# Patient Record
Sex: FEMALE | Race: ASIAN | NOT HISPANIC OR LATINO
[De-identification: names, ages, dates, MRNs, and addresses within clinical notes are randomized per-mention and may not be internally consistent; named-entity substitution may affect disease eponyms.]

---

## 2022-09-14 ENCOUNTER — APPOINTMENT (OUTPATIENT)
Dept: OBGYN | Facility: CLINIC | Age: 27
End: 2022-09-14

## 2022-09-14 VITALS
DIASTOLIC BLOOD PRESSURE: 86 MMHG | SYSTOLIC BLOOD PRESSURE: 139 MMHG | WEIGHT: 152 LBS | HEIGHT: 63 IN | BODY MASS INDEX: 26.93 KG/M2

## 2022-09-14 DIAGNOSIS — Z82.49 FAMILY HISTORY OF ISCHEMIC HEART DISEASE AND OTHER DISEASES OF THE CIRCULATORY SYSTEM: ICD-10-CM

## 2022-09-14 DIAGNOSIS — N92.6 IRREGULAR MENSTRUATION, UNSPECIFIED: ICD-10-CM

## 2022-09-14 DIAGNOSIS — Z01.419 ENCOUNTER FOR GYNECOLOGICAL EXAMINATION (GENERAL) (ROUTINE) W/OUT ABNORMAL FINDINGS: ICD-10-CM

## 2022-09-14 DIAGNOSIS — Z78.9 OTHER SPECIFIED HEALTH STATUS: ICD-10-CM

## 2022-09-14 PROCEDURE — 99385 PREV VISIT NEW AGE 18-39: CPT

## 2022-09-14 RX ORDER — PRENATAL VIT NO.126/IRON/FOLIC 28MG-0.8MG
28-0.8 TABLET ORAL
Refills: 0 | Status: ACTIVE | COMMUNITY

## 2022-09-15 ENCOUNTER — NON-APPOINTMENT (OUTPATIENT)
Age: 27
End: 2022-09-15

## 2022-09-15 LAB
ABO + RH PNL BLD: NORMAL
BASOPHILS # BLD AUTO: 0.03 K/UL
BASOPHILS NFR BLD AUTO: 0.3 %
C TRACH RRNA SPEC QL NAA+PROBE: NOT DETECTED
EOSINOPHIL # BLD AUTO: 0.04 K/UL
EOSINOPHIL NFR BLD AUTO: 0.4 %
GLUCOSE SERPL-MCNC: 95 MG/DL
HAV IGM SER QL: NONREACTIVE
HBV CORE IGM SER QL: NONREACTIVE
HBV SURFACE AG SER QL: NONREACTIVE
HBV SURFACE AG SER QL: NONREACTIVE
HCT VFR BLD CALC: 35.8 %
HCV AB SER QL: NONREACTIVE
HCV S/CO RATIO: 0.13 S/CO
HGB A MFR BLD: 97.4 %
HGB A2 MFR BLD: 2.6 %
HGB BLD-MCNC: 12.1 G/DL
HGB FRACT BLD-IMP: NORMAL
HIV1+2 AB SPEC QL IA.RAPID: NONREACTIVE
HPV HIGH+LOW RISK DNA PNL CVX: NOT DETECTED
IMM GRANULOCYTES NFR BLD AUTO: 0.4 %
LYMPHOCYTES # BLD AUTO: 2.27 K/UL
LYMPHOCYTES NFR BLD AUTO: 23.9 %
MAN DIFF?: NORMAL
MCHC RBC-ENTMCNC: 29.7 PG
MCHC RBC-ENTMCNC: 33.8 GM/DL
MCV RBC AUTO: 88 FL
MEV IGG FLD QL IA: 52.5 AU/ML
MEV IGG+IGM SER-IMP: POSITIVE
MONOCYTES # BLD AUTO: 0.53 K/UL
MONOCYTES NFR BLD AUTO: 5.6 %
MUV AB SER-ACNC: POSITIVE
MUV IGG SER QL IA: 63.1 AU/ML
N GONORRHOEA RRNA SPEC QL NAA+PROBE: NOT DETECTED
NEUTROPHILS # BLD AUTO: 6.58 K/UL
NEUTROPHILS NFR BLD AUTO: 69.4 %
PLATELET # BLD AUTO: 245 K/UL
RBC # BLD: 4.07 M/UL
RBC # FLD: 13.9 %
RUBV IGG FLD-ACNC: 4.8 INDEX
RUBV IGG SER-IMP: POSITIVE
SOURCE AMPLIFICATION: NORMAL
T PALLIDUM AB SER QL IA: NEGATIVE
WBC # FLD AUTO: 9.49 K/UL

## 2022-09-16 ENCOUNTER — NON-APPOINTMENT (OUTPATIENT)
Age: 27
End: 2022-09-16

## 2022-09-16 LAB
APPEARANCE: CLEAR
BACTERIA UR CULT: NORMAL
BACTERIA: NEGATIVE
BILIRUBIN URINE: NEGATIVE
BLOOD URINE: NEGATIVE
COLOR: COLORLESS
ESTIMATED AVERAGE GLUCOSE: 120 MG/DL
GLUCOSE QUALITATIVE U: NEGATIVE
HBA1C MFR BLD HPLC: 5.8 %
HYALINE CASTS: 1 /LPF
KETONES URINE: NEGATIVE
LEAD BLD-MCNC: <1 UG/DL
LEUKOCYTE ESTERASE URINE: ABNORMAL
MICROSCOPIC-UA: NORMAL
NITRITE URINE: NEGATIVE
PH URINE: 7.5
PROTEIN URINE: NEGATIVE
RED BLOOD CELLS URINE: 1 /HPF
SPECIFIC GRAVITY URINE: 1.01
SQUAMOUS EPITHELIAL CELLS: 1 /HPF
UROBILINOGEN URINE: NORMAL
WHITE BLOOD CELLS URINE: 0 /HPF

## 2022-09-19 LAB — AR GENE MUT ANL BLD/T: NORMAL

## 2022-09-19 NOTE — PLAN
[FreeTextEntry1] : 27 y/o P0 at 16w by LMP presenting with amenorrhea:\par -f/u pap, GCT/CT\par -f/u prenatal blood work drawn today\par -f/u nipts done today\par -Pt already taking PNV\par -f/u 2 weeks for confirmation of pregnancy\par

## 2022-09-20 LAB
CYTOLOGY CVX/VAG DOC THIN PREP: ABNORMAL
FMR1 GENE MUT ANL BLD/T: NORMAL
M TB IFN-G BLD-IMP: POSITIVE
QUANTIFERON TB PLUS MITOGEN MINUS NIL: 7.07 IU/ML
QUANTIFERON TB PLUS NIL: 0.06 IU/ML
QUANTIFERON TB PLUS TB1 MINUS NIL: 0.46 IU/ML
QUANTIFERON TB PLUS TB2 MINUS NIL: 0.37 IU/ML

## 2022-09-23 LAB — CFTR MUT TESTED BLD/T: NEGATIVE

## 2022-09-27 LAB
CLARI ADDITIONAL INFO: NORMAL
CLARI CHROMOSOME 13: NORMAL
CLARI CHROMOSOME 18: NORMAL
CLARI CHROMOSOME 21: NORMAL
CLARI SEX CHROMOSOMES: NORMAL
CLARI TEST COMMENT: NORMAL
CLARITEST NIPT: NORMAL
FETAL FRACT: NORMAL
GESTATION AGE: NORMAL
MATERNAL WEIGHT (LBS):: NORMAL
PLEASE INCLUDE GENDER RESULTS ON THIS REPORT:: NORMAL
TYPE OF PREGNANCY:: NORMAL

## 2022-09-28 ENCOUNTER — APPOINTMENT (OUTPATIENT)
Dept: ANTEPARTUM | Facility: CLINIC | Age: 27
End: 2022-09-28

## 2022-09-28 ENCOUNTER — APPOINTMENT (OUTPATIENT)
Dept: OBGYN | Facility: CLINIC | Age: 27
End: 2022-09-28

## 2022-09-28 DIAGNOSIS — Z3A.16 16 WEEKS GESTATION OF PREGNANCY: ICD-10-CM

## 2022-09-28 PROCEDURE — 99212 OFFICE O/P EST SF 10 MIN: CPT

## 2022-09-28 PROCEDURE — 76815 OB US LIMITED FETUS(S): CPT

## 2022-09-29 LAB
BASOPHILS # BLD AUTO: 0.02 K/UL
BASOPHILS NFR BLD AUTO: 0.2 %
EOSINOPHIL # BLD AUTO: 0.03 K/UL
EOSINOPHIL NFR BLD AUTO: 0.3 %
HCT VFR BLD CALC: 36.4 %
HGB BLD-MCNC: 11.9 G/DL
IMM GRANULOCYTES NFR BLD AUTO: 0.5 %
LYMPHOCYTES # BLD AUTO: 1.48 K/UL
LYMPHOCYTES NFR BLD AUTO: 16.1 %
MAN DIFF?: NORMAL
MCHC RBC-ENTMCNC: 29.8 PG
MCHC RBC-ENTMCNC: 32.7 GM/DL
MCV RBC AUTO: 91 FL
MONOCYTES # BLD AUTO: 0.36 K/UL
MONOCYTES NFR BLD AUTO: 3.9 %
NEUTROPHILS # BLD AUTO: 7.23 K/UL
NEUTROPHILS NFR BLD AUTO: 79 %
PLATELET # BLD AUTO: 234 K/UL
RBC # BLD: 4 M/UL
RBC # FLD: 14.2 %
WBC # FLD AUTO: 9.17 K/UL

## 2022-09-30 DIAGNOSIS — Z00.00 ENCOUNTER FOR GENERAL ADULT MEDICAL EXAMINATION W/OUT ABNORMAL FINDINGS: ICD-10-CM

## 2022-09-30 LAB — GLUCOSE 1H P 50 G GLC PO SERPL-MCNC: 166 MG/DL

## 2022-10-10 ENCOUNTER — TRANSCRIPTION ENCOUNTER (OUTPATIENT)
Age: 27
End: 2022-10-10

## 2022-10-13 ENCOUNTER — NON-APPOINTMENT (OUTPATIENT)
Age: 27
End: 2022-10-13

## 2022-10-13 LAB
ESTIMATED AVERAGE GLUCOSE: 114 MG/DL
HBA1C MFR BLD HPLC: 5.6 %

## 2022-10-26 ENCOUNTER — APPOINTMENT (OUTPATIENT)
Dept: ANTEPARTUM | Facility: CLINIC | Age: 27
End: 2022-10-26

## 2022-10-26 ENCOUNTER — APPOINTMENT (OUTPATIENT)
Dept: OBGYN | Facility: CLINIC | Age: 27
End: 2022-10-26

## 2022-10-26 VITALS
WEIGHT: 150 LBS | HEIGHT: 63 IN | SYSTOLIC BLOOD PRESSURE: 146 MMHG | DIASTOLIC BLOOD PRESSURE: 92 MMHG | BODY MASS INDEX: 26.58 KG/M2

## 2022-10-26 PROCEDURE — 99212 OFFICE O/P EST SF 10 MIN: CPT

## 2022-10-26 PROCEDURE — 76811 OB US DETAILED SNGL FETUS: CPT

## 2022-10-31 ENCOUNTER — APPOINTMENT (OUTPATIENT)
Dept: OBGYN | Facility: CLINIC | Age: 27
End: 2022-10-31

## 2022-10-31 ENCOUNTER — APPOINTMENT (OUTPATIENT)
Dept: ANTEPARTUM | Facility: CLINIC | Age: 27
End: 2022-10-31

## 2022-10-31 VITALS
SYSTOLIC BLOOD PRESSURE: 153 MMHG | HEIGHT: 63 IN | BODY MASS INDEX: 27.11 KG/M2 | DIASTOLIC BLOOD PRESSURE: 106 MMHG | WEIGHT: 153 LBS

## 2022-10-31 PROCEDURE — ZZZZZ: CPT

## 2022-10-31 PROCEDURE — 76816 OB US FOLLOW-UP PER FETUS: CPT

## 2022-11-28 ENCOUNTER — APPOINTMENT (OUTPATIENT)
Dept: OBGYN | Facility: CLINIC | Age: 27
End: 2022-11-28

## 2022-11-28 VITALS — BODY MASS INDEX: 27.28 KG/M2 | SYSTOLIC BLOOD PRESSURE: 149 MMHG | DIASTOLIC BLOOD PRESSURE: 80 MMHG | WEIGHT: 154 LBS

## 2022-11-28 PROCEDURE — 99212 OFFICE O/P EST SF 10 MIN: CPT

## 2022-11-29 ENCOUNTER — NON-APPOINTMENT (OUTPATIENT)
Age: 27
End: 2022-11-29

## 2022-11-29 LAB
BASOPHILS # BLD AUTO: 0.03 K/UL
BASOPHILS NFR BLD AUTO: 0.3 %
EOSINOPHIL # BLD AUTO: 0.04 K/UL
EOSINOPHIL NFR BLD AUTO: 0.3 %
GLUCOSE 1H P 50 G GLC PO SERPL-MCNC: 131 MG/DL
HCT VFR BLD CALC: 36.6 %
HGB BLD-MCNC: 11.5 G/DL
IMM GRANULOCYTES NFR BLD AUTO: 0.6 %
LYMPHOCYTES # BLD AUTO: 1.59 K/UL
LYMPHOCYTES NFR BLD AUTO: 13.9 %
MAN DIFF?: NORMAL
MCHC RBC-ENTMCNC: 30.3 PG
MCHC RBC-ENTMCNC: 31.4 GM/DL
MCV RBC AUTO: 96.6 FL
MONOCYTES # BLD AUTO: 0.59 K/UL
MONOCYTES NFR BLD AUTO: 5.2 %
NEUTROPHILS # BLD AUTO: 9.13 K/UL
NEUTROPHILS NFR BLD AUTO: 79.7 %
PLATELET # BLD AUTO: 204 K/UL
RBC # BLD: 3.79 M/UL
RBC # FLD: 14.9 %
WBC # FLD AUTO: 11.45 K/UL

## 2022-12-12 ENCOUNTER — NON-APPOINTMENT (OUTPATIENT)
Age: 27
End: 2022-12-12

## 2022-12-21 ENCOUNTER — MED ADMIN CHARGE (OUTPATIENT)
Age: 27
End: 2022-12-21

## 2022-12-21 ENCOUNTER — APPOINTMENT (OUTPATIENT)
Dept: OBGYN | Facility: CLINIC | Age: 27
End: 2022-12-21

## 2022-12-21 DIAGNOSIS — Z33.1 PREGNANT STATE, INCIDENTAL: ICD-10-CM

## 2022-12-21 PROCEDURE — 99212 OFFICE O/P EST SF 10 MIN: CPT

## 2022-12-21 RX ORDER — NIFEDIPINE 30 MG/1
30 TABLET, FILM COATED, EXTENDED RELEASE ORAL DAILY
Qty: 30 | Refills: 3 | Status: ACTIVE | COMMUNITY
Start: 2022-12-21 | End: 1900-01-01

## 2023-01-04 ENCOUNTER — APPOINTMENT (OUTPATIENT)
Dept: ANTEPARTUM | Facility: CLINIC | Age: 28
End: 2023-01-04
Payer: COMMERCIAL

## 2023-01-04 ENCOUNTER — APPOINTMENT (OUTPATIENT)
Dept: OBGYN | Facility: CLINIC | Age: 28
End: 2023-01-04
Payer: COMMERCIAL

## 2023-01-04 VITALS — SYSTOLIC BLOOD PRESSURE: 146 MMHG | DIASTOLIC BLOOD PRESSURE: 90 MMHG | WEIGHT: 162 LBS | BODY MASS INDEX: 28.7 KG/M2

## 2023-01-04 PROCEDURE — 99212 OFFICE O/P EST SF 10 MIN: CPT

## 2023-01-04 PROCEDURE — 76816 OB US FOLLOW-UP PER FETUS: CPT

## 2023-01-04 PROCEDURE — 76819 FETAL BIOPHYS PROFIL W/O NST: CPT | Mod: 59

## 2023-01-17 ENCOUNTER — APPOINTMENT (OUTPATIENT)
Dept: OBGYN | Facility: CLINIC | Age: 28
End: 2023-01-17
Payer: COMMERCIAL

## 2023-01-17 VITALS
BODY MASS INDEX: 29.23 KG/M2 | SYSTOLIC BLOOD PRESSURE: 152 MMHG | DIASTOLIC BLOOD PRESSURE: 94 MMHG | HEART RATE: 134 BPM | WEIGHT: 165 LBS | HEIGHT: 63 IN

## 2023-01-17 DIAGNOSIS — Z23 ENCOUNTER FOR IMMUNIZATION: ICD-10-CM

## 2023-01-17 DIAGNOSIS — O13.9 GESTATIONAL [PREGNANCY-INDUCED] HYPERTENSION W/OUT SIGNIFICANT PROTEINURIA, UNSPECIFIED TRIMESTER: ICD-10-CM

## 2023-01-17 DIAGNOSIS — R76.12 NONSPECIFIC REACTION TO CELL MEDIATED IMMUNITY MEASUREMENT OF GAMMA INTERFERON ANTIGEN RESPONSE W/OUT ACTIVE TUBERCULOSIS: ICD-10-CM

## 2023-01-17 PROCEDURE — 90471 IMMUNIZATION ADMIN: CPT

## 2023-01-17 PROCEDURE — 99212 OFFICE O/P EST SF 10 MIN: CPT | Mod: 25

## 2023-01-17 PROCEDURE — 90715 TDAP VACCINE 7 YRS/> IM: CPT

## 2023-01-18 LAB
ALBUMIN SERPL ELPH-MCNC: 3.9 G/DL
ALP BLD-CCNC: 86 U/L
ALT SERPL-CCNC: 12 U/L
ANION GAP SERPL CALC-SCNC: 15 MMOL/L
APTT BLD: 29.3 SEC
AST SERPL-CCNC: 14 U/L
BASOPHILS # BLD AUTO: 0.02 K/UL
BASOPHILS NFR BLD AUTO: 0.2 %
BILIRUB SERPL-MCNC: <0.2 MG/DL
BUN SERPL-MCNC: 8 MG/DL
CALCIUM SERPL-MCNC: 10 MG/DL
CHLORIDE SERPL-SCNC: 102 MMOL/L
CO2 SERPL-SCNC: 20 MMOL/L
CREAT SERPL-MCNC: 0.46 MG/DL
CREAT SPEC-SCNC: 77 MG/DL
CREAT/PROT UR: 0.1 RATIO
EGFR: 135 ML/MIN/1.73M2
EOSINOPHIL # BLD AUTO: 0.02 K/UL
EOSINOPHIL NFR BLD AUTO: 0.2 %
GLUCOSE SERPL-MCNC: 128 MG/DL
HCT VFR BLD CALC: 38.4 %
HGB BLD-MCNC: 12.7 G/DL
IMM GRANULOCYTES NFR BLD AUTO: 0.5 %
INR PPP: 1.04 RATIO
LDH SERPL-CCNC: 158 U/L
LYMPHOCYTES # BLD AUTO: 1.78 K/UL
LYMPHOCYTES NFR BLD AUTO: 19.5 %
MAN DIFF?: NORMAL
MCHC RBC-ENTMCNC: 31 PG
MCHC RBC-ENTMCNC: 33.1 GM/DL
MCV RBC AUTO: 93.7 FL
MONOCYTES # BLD AUTO: 0.52 K/UL
MONOCYTES NFR BLD AUTO: 5.7 %
NEUTROPHILS # BLD AUTO: 6.76 K/UL
NEUTROPHILS NFR BLD AUTO: 73.9 %
PLATELET # BLD AUTO: 200 K/UL
POTASSIUM SERPL-SCNC: 3.9 MMOL/L
PROT SERPL-MCNC: 7.2 G/DL
PROT UR-MCNC: 9 MG/DL
PT BLD: 12.1 SEC
RBC # BLD: 4.1 M/UL
RBC # FLD: 13.8 %
SODIUM SERPL-SCNC: 137 MMOL/L
URATE SERPL-MCNC: 4.7 MG/DL
WBC # FLD AUTO: 9.15 K/UL

## 2023-02-02 ENCOUNTER — APPOINTMENT (OUTPATIENT)
Dept: OBGYN | Facility: CLINIC | Age: 28
End: 2023-02-02
Payer: COMMERCIAL

## 2023-02-02 VITALS — WEIGHT: 168.8 LBS | BODY MASS INDEX: 29.9 KG/M2

## 2023-02-02 PROCEDURE — 99212 OFFICE O/P EST SF 10 MIN: CPT

## 2023-02-06 ENCOUNTER — NON-APPOINTMENT (OUTPATIENT)
Age: 28
End: 2023-02-06

## 2023-02-07 ENCOUNTER — OUTPATIENT (OUTPATIENT)
Dept: OUTPATIENT SERVICES | Facility: HOSPITAL | Age: 28
LOS: 1 days | End: 2023-02-07
Payer: COMMERCIAL

## 2023-02-07 ENCOUNTER — RESULT REVIEW (OUTPATIENT)
Age: 28
End: 2023-02-07

## 2023-02-07 ENCOUNTER — APPOINTMENT (OUTPATIENT)
Dept: RADIOLOGY | Facility: CLINIC | Age: 28
End: 2023-02-07
Payer: COMMERCIAL

## 2023-02-07 DIAGNOSIS — R76.12 NONSPECIFIC REACTION TO CELL MEDIATED IMMUNITY MEASUREMENT OF GAMMA INTERFERON ANTIGEN RESPONSE WITHOUT ACTIVE TUBERCULOSIS: ICD-10-CM

## 2023-02-07 PROCEDURE — 71045 X-RAY EXAM CHEST 1 VIEW: CPT | Mod: 26

## 2023-02-07 PROCEDURE — 71045 X-RAY EXAM CHEST 1 VIEW: CPT

## 2023-02-12 ENCOUNTER — TRANSCRIPTION ENCOUNTER (OUTPATIENT)
Age: 28
End: 2023-02-12

## 2023-02-13 ENCOUNTER — RESULT REVIEW (OUTPATIENT)
Age: 28
End: 2023-02-13

## 2023-02-13 ENCOUNTER — TRANSCRIPTION ENCOUNTER (OUTPATIENT)
Age: 28
End: 2023-02-13

## 2023-02-13 ENCOUNTER — APPOINTMENT (OUTPATIENT)
Dept: OBGYN | Facility: CLINIC | Age: 28
End: 2023-02-13

## 2023-02-13 ENCOUNTER — INPATIENT (INPATIENT)
Facility: HOSPITAL | Age: 28
LOS: 2 days | Discharge: HOME | End: 2023-02-16
Attending: OBSTETRICS & GYNECOLOGY | Admitting: OBSTETRICS & GYNECOLOGY
Payer: COMMERCIAL

## 2023-02-13 VITALS — TEMPERATURE: 98 F

## 2023-02-13 DIAGNOSIS — O13.9 GESTATIONAL [PREGNANCY-INDUCED] HYPERTENSION WITHOUT SIGNIFICANT PROTEINURIA, UNSPECIFIED TRIMESTER: ICD-10-CM

## 2023-02-13 DIAGNOSIS — O26.899 OTHER SPECIFIED PREGNANCY RELATED CONDITIONS, UNSPECIFIED TRIMESTER: ICD-10-CM

## 2023-02-13 DIAGNOSIS — O42.919 PRETERM PREMATURE RUPTURE OF MEMBRANES, UNSPECIFIED AS TO LENGTH OF TIME BETWEEN RUPTURE AND ONSET OF LABOR, UNSPECIFIED TRIMESTER: ICD-10-CM

## 2023-02-13 LAB
ALBUMIN SERPL ELPH-MCNC: 3.5 G/DL — SIGNIFICANT CHANGE UP (ref 3.3–5)
ALBUMIN SERPL ELPH-MCNC: 3.6 G/DL — SIGNIFICANT CHANGE UP (ref 3.3–5)
ALBUMIN SERPL ELPH-MCNC: 3.8 G/DL — SIGNIFICANT CHANGE UP (ref 3.3–5)
ALP SERPL-CCNC: 103 U/L — SIGNIFICANT CHANGE UP (ref 40–120)
ALP SERPL-CCNC: 104 U/L — SIGNIFICANT CHANGE UP (ref 40–120)
ALP SERPL-CCNC: 112 U/L — SIGNIFICANT CHANGE UP (ref 40–120)
ALT FLD-CCNC: 12 U/L — SIGNIFICANT CHANGE UP (ref 4–33)
ANION GAP SERPL CALC-SCNC: 12 MMOL/L — SIGNIFICANT CHANGE UP (ref 7–14)
ANION GAP SERPL CALC-SCNC: 13 MMOL/L — SIGNIFICANT CHANGE UP (ref 7–14)
ANION GAP SERPL CALC-SCNC: 14 MMOL/L — SIGNIFICANT CHANGE UP (ref 7–14)
APPEARANCE UR: CLEAR — SIGNIFICANT CHANGE UP
APTT BLD: 26.7 SEC — LOW (ref 27–36.3)
APTT BLD: 26.7 SEC — LOW (ref 27–36.3)
APTT BLD: 27 SEC — SIGNIFICANT CHANGE UP (ref 27–36.3)
AST SERPL-CCNC: 14 U/L — SIGNIFICANT CHANGE UP (ref 4–32)
AST SERPL-CCNC: 14 U/L — SIGNIFICANT CHANGE UP (ref 4–32)
AST SERPL-CCNC: 16 U/L — SIGNIFICANT CHANGE UP (ref 4–32)
BACTERIA # UR AUTO: NEGATIVE — SIGNIFICANT CHANGE UP
BASOPHILS # BLD AUTO: 0.02 K/UL — SIGNIFICANT CHANGE UP (ref 0–0.2)
BASOPHILS # BLD AUTO: 0.02 K/UL — SIGNIFICANT CHANGE UP (ref 0–0.2)
BASOPHILS # BLD AUTO: 0.03 K/UL — SIGNIFICANT CHANGE UP (ref 0–0.2)
BASOPHILS NFR BLD AUTO: 0.2 % — SIGNIFICANT CHANGE UP (ref 0–2)
BILIRUB SERPL-MCNC: 0.2 MG/DL — SIGNIFICANT CHANGE UP (ref 0.2–1.2)
BILIRUB SERPL-MCNC: <0.2 MG/DL — SIGNIFICANT CHANGE UP (ref 0.2–1.2)
BILIRUB SERPL-MCNC: <0.2 MG/DL — SIGNIFICANT CHANGE UP (ref 0.2–1.2)
BILIRUB UR-MCNC: NEGATIVE — SIGNIFICANT CHANGE UP
BLD GP AB SCN SERPL QL: NEGATIVE — SIGNIFICANT CHANGE UP
BUN SERPL-MCNC: 11 MG/DL — SIGNIFICANT CHANGE UP (ref 7–23)
BUN SERPL-MCNC: 6 MG/DL — LOW (ref 7–23)
BUN SERPL-MCNC: 7 MG/DL — SIGNIFICANT CHANGE UP (ref 7–23)
CALCIUM SERPL-MCNC: 9.2 MG/DL — SIGNIFICANT CHANGE UP (ref 8.4–10.5)
CALCIUM SERPL-MCNC: 9.4 MG/DL — SIGNIFICANT CHANGE UP (ref 8.4–10.5)
CALCIUM SERPL-MCNC: 9.8 MG/DL — SIGNIFICANT CHANGE UP (ref 8.4–10.5)
CHLORIDE SERPL-SCNC: 100 MMOL/L — SIGNIFICANT CHANGE UP (ref 98–107)
CHLORIDE SERPL-SCNC: 103 MMOL/L — SIGNIFICANT CHANGE UP (ref 98–107)
CHLORIDE SERPL-SCNC: 99 MMOL/L — SIGNIFICANT CHANGE UP (ref 98–107)
CO2 SERPL-SCNC: 21 MMOL/L — LOW (ref 22–31)
CO2 SERPL-SCNC: 22 MMOL/L — SIGNIFICANT CHANGE UP (ref 22–31)
CO2 SERPL-SCNC: 23 MMOL/L — SIGNIFICANT CHANGE UP (ref 22–31)
COLOR SPEC: SIGNIFICANT CHANGE UP
CREAT ?TM UR-MCNC: 100 MG/DL — SIGNIFICANT CHANGE UP
CREAT SERPL-MCNC: 0.43 MG/DL — LOW (ref 0.5–1.3)
CREAT SERPL-MCNC: 0.43 MG/DL — LOW (ref 0.5–1.3)
CREAT SERPL-MCNC: 0.53 MG/DL — SIGNIFICANT CHANGE UP (ref 0.5–1.3)
DIFF PNL FLD: NEGATIVE — SIGNIFICANT CHANGE UP
EGFR: 131 ML/MIN/1.73M2 — SIGNIFICANT CHANGE UP
EGFR: 137 ML/MIN/1.73M2 — SIGNIFICANT CHANGE UP
EGFR: 137 ML/MIN/1.73M2 — SIGNIFICANT CHANGE UP
EOSINOPHIL # BLD AUTO: 0 K/UL — SIGNIFICANT CHANGE UP (ref 0–0.5)
EOSINOPHIL # BLD AUTO: 0 K/UL — SIGNIFICANT CHANGE UP (ref 0–0.5)
EOSINOPHIL # BLD AUTO: 0.09 K/UL — SIGNIFICANT CHANGE UP (ref 0–0.5)
EOSINOPHIL NFR BLD AUTO: 0 % — SIGNIFICANT CHANGE UP (ref 0–6)
EOSINOPHIL NFR BLD AUTO: 0 % — SIGNIFICANT CHANGE UP (ref 0–6)
EOSINOPHIL NFR BLD AUTO: 0.8 % — SIGNIFICANT CHANGE UP (ref 0–6)
EPI CELLS # UR: 2 /HPF — SIGNIFICANT CHANGE UP (ref 0–5)
FIBRINOGEN PPP-MCNC: 711 MG/DL — HIGH (ref 200–465)
FIBRINOGEN PPP-MCNC: 734 MG/DL — HIGH (ref 200–465)
GLUCOSE SERPL-MCNC: 77 MG/DL — SIGNIFICANT CHANGE UP (ref 70–99)
GLUCOSE SERPL-MCNC: 85 MG/DL — SIGNIFICANT CHANGE UP (ref 70–99)
GLUCOSE SERPL-MCNC: 99 MG/DL — SIGNIFICANT CHANGE UP (ref 70–99)
GLUCOSE UR QL: NEGATIVE — SIGNIFICANT CHANGE UP
HCT VFR BLD CALC: 36.2 % — SIGNIFICANT CHANGE UP (ref 34.5–45)
HCT VFR BLD CALC: 36.2 % — SIGNIFICANT CHANGE UP (ref 34.5–45)
HCT VFR BLD CALC: 38.3 % — SIGNIFICANT CHANGE UP (ref 34.5–45)
HGB BLD-MCNC: 12.1 G/DL — SIGNIFICANT CHANGE UP (ref 11.5–15.5)
HGB BLD-MCNC: 12.2 G/DL — SIGNIFICANT CHANGE UP (ref 11.5–15.5)
HGB BLD-MCNC: 12.6 G/DL — SIGNIFICANT CHANGE UP (ref 11.5–15.5)
IANC: 12.83 K/UL — HIGH (ref 1.8–7.4)
IANC: 6.61 K/UL — SIGNIFICANT CHANGE UP (ref 1.8–7.4)
IANC: 7.79 K/UL — HIGH (ref 1.8–7.4)
IMM GRANULOCYTES NFR BLD AUTO: 0.6 % — SIGNIFICANT CHANGE UP (ref 0–0.9)
IMM GRANULOCYTES NFR BLD AUTO: 0.8 % — SIGNIFICANT CHANGE UP (ref 0–0.9)
IMM GRANULOCYTES NFR BLD AUTO: 0.8 % — SIGNIFICANT CHANGE UP (ref 0–0.9)
INR BLD: 0.91 RATIO — SIGNIFICANT CHANGE UP (ref 0.88–1.16)
INR BLD: 0.97 RATIO — SIGNIFICANT CHANGE UP (ref 0.88–1.16)
INR BLD: 1 RATIO — SIGNIFICANT CHANGE UP (ref 0.88–1.16)
KETONES UR-MCNC: NEGATIVE — SIGNIFICANT CHANGE UP
LDH SERPL L TO P-CCNC: 145 U/L — SIGNIFICANT CHANGE UP (ref 135–225)
LDH SERPL L TO P-CCNC: 149 U/L — SIGNIFICANT CHANGE UP (ref 135–225)
LDH SERPL L TO P-CCNC: 158 U/L — SIGNIFICANT CHANGE UP (ref 135–225)
LDH SERPL L TO P-CCNC: 158 U/L — SIGNIFICANT CHANGE UP (ref 135–225)
LEUKOCYTE ESTERASE UR-ACNC: NEGATIVE — SIGNIFICANT CHANGE UP
LYMPHOCYTES # BLD AUTO: 1.68 K/UL — SIGNIFICANT CHANGE UP (ref 1–3.3)
LYMPHOCYTES # BLD AUTO: 1.75 K/UL — SIGNIFICANT CHANGE UP (ref 1–3.3)
LYMPHOCYTES # BLD AUTO: 11.4 % — LOW (ref 13–44)
LYMPHOCYTES # BLD AUTO: 16.9 % — SIGNIFICANT CHANGE UP (ref 13–44)
LYMPHOCYTES # BLD AUTO: 3.32 K/UL — HIGH (ref 1–3.3)
LYMPHOCYTES # BLD AUTO: 30.8 % — SIGNIFICANT CHANGE UP (ref 13–44)
MCHC RBC-ENTMCNC: 29.2 PG — SIGNIFICANT CHANGE UP (ref 27–34)
MCHC RBC-ENTMCNC: 29.2 PG — SIGNIFICANT CHANGE UP (ref 27–34)
MCHC RBC-ENTMCNC: 29.5 PG — SIGNIFICANT CHANGE UP (ref 27–34)
MCHC RBC-ENTMCNC: 32.9 GM/DL — SIGNIFICANT CHANGE UP (ref 32–36)
MCHC RBC-ENTMCNC: 33.4 GM/DL — SIGNIFICANT CHANGE UP (ref 32–36)
MCHC RBC-ENTMCNC: 33.7 GM/DL — SIGNIFICANT CHANGE UP (ref 32–36)
MCV RBC AUTO: 86.6 FL — SIGNIFICANT CHANGE UP (ref 80–100)
MCV RBC AUTO: 87.4 FL — SIGNIFICANT CHANGE UP (ref 80–100)
MCV RBC AUTO: 89.7 FL — SIGNIFICANT CHANGE UP (ref 80–100)
MONOCYTES # BLD AUTO: 0.38 K/UL — SIGNIFICANT CHANGE UP (ref 0–0.9)
MONOCYTES # BLD AUTO: 0.62 K/UL — SIGNIFICANT CHANGE UP (ref 0–0.9)
MONOCYTES # BLD AUTO: 0.68 K/UL — SIGNIFICANT CHANGE UP (ref 0–0.9)
MONOCYTES NFR BLD AUTO: 3.8 % — SIGNIFICANT CHANGE UP (ref 2–14)
MONOCYTES NFR BLD AUTO: 4 % — SIGNIFICANT CHANGE UP (ref 2–14)
MONOCYTES NFR BLD AUTO: 6.3 % — SIGNIFICANT CHANGE UP (ref 2–14)
NEUTROPHILS # BLD AUTO: 12.83 K/UL — HIGH (ref 1.8–7.4)
NEUTROPHILS # BLD AUTO: 6.61 K/UL — SIGNIFICANT CHANGE UP (ref 1.8–7.4)
NEUTROPHILS # BLD AUTO: 7.79 K/UL — HIGH (ref 1.8–7.4)
NEUTROPHILS NFR BLD AUTO: 61.3 % — SIGNIFICANT CHANGE UP (ref 43–77)
NEUTROPHILS NFR BLD AUTO: 78.3 % — HIGH (ref 43–77)
NEUTROPHILS NFR BLD AUTO: 83.6 % — HIGH (ref 43–77)
NITRITE UR-MCNC: NEGATIVE — SIGNIFICANT CHANGE UP
NRBC # BLD: 0 /100 WBCS — SIGNIFICANT CHANGE UP (ref 0–0)
NRBC # FLD: 0 K/UL — SIGNIFICANT CHANGE UP (ref 0–0)
PH UR: 7 — SIGNIFICANT CHANGE UP (ref 5–8)
PLATELET # BLD AUTO: 219 K/UL — SIGNIFICANT CHANGE UP (ref 150–400)
PLATELET # BLD AUTO: 226 K/UL — SIGNIFICANT CHANGE UP (ref 150–400)
PLATELET # BLD AUTO: 227 K/UL — SIGNIFICANT CHANGE UP (ref 150–400)
POTASSIUM SERPL-MCNC: 3.6 MMOL/L — SIGNIFICANT CHANGE UP (ref 3.5–5.3)
POTASSIUM SERPL-MCNC: 3.9 MMOL/L — SIGNIFICANT CHANGE UP (ref 3.5–5.3)
POTASSIUM SERPL-MCNC: 3.9 MMOL/L — SIGNIFICANT CHANGE UP (ref 3.5–5.3)
POTASSIUM SERPL-SCNC: 3.6 MMOL/L — SIGNIFICANT CHANGE UP (ref 3.5–5.3)
POTASSIUM SERPL-SCNC: 3.9 MMOL/L — SIGNIFICANT CHANGE UP (ref 3.5–5.3)
POTASSIUM SERPL-SCNC: 3.9 MMOL/L — SIGNIFICANT CHANGE UP (ref 3.5–5.3)
PROT ?TM UR-MCNC: 34 MG/DL — SIGNIFICANT CHANGE UP
PROT SERPL-MCNC: 6.6 G/DL — SIGNIFICANT CHANGE UP (ref 6–8.3)
PROT SERPL-MCNC: 6.7 G/DL — SIGNIFICANT CHANGE UP (ref 6–8.3)
PROT SERPL-MCNC: 7.4 G/DL — SIGNIFICANT CHANGE UP (ref 6–8.3)
PROT UR-MCNC: ABNORMAL
PROT/CREAT UR-RTO: 0.3 RATIO — HIGH (ref 0–0.2)
PROTHROM AB SERPL-ACNC: 10.6 SEC — SIGNIFICANT CHANGE UP (ref 10.5–13.4)
PROTHROM AB SERPL-ACNC: 11.2 SEC — SIGNIFICANT CHANGE UP (ref 10.5–13.4)
PROTHROM AB SERPL-ACNC: 11.6 SEC — SIGNIFICANT CHANGE UP (ref 10.5–13.4)
RBC # BLD: 4.14 M/UL — SIGNIFICANT CHANGE UP (ref 3.8–5.2)
RBC # BLD: 4.18 M/UL — SIGNIFICANT CHANGE UP (ref 3.8–5.2)
RBC # BLD: 4.27 M/UL — SIGNIFICANT CHANGE UP (ref 3.8–5.2)
RBC # FLD: 13.2 % — SIGNIFICANT CHANGE UP (ref 10.3–14.5)
RBC # FLD: 13.2 % — SIGNIFICANT CHANGE UP (ref 10.3–14.5)
RBC # FLD: 13.3 % — SIGNIFICANT CHANGE UP (ref 10.3–14.5)
RBC CASTS # UR COMP ASSIST: 4 /HPF — SIGNIFICANT CHANGE UP (ref 0–4)
RH IG SCN BLD-IMP: POSITIVE — SIGNIFICANT CHANGE UP
RH IG SCN BLD-IMP: POSITIVE — SIGNIFICANT CHANGE UP
SARS-COV-2 RNA SPEC QL NAA+PROBE: SIGNIFICANT CHANGE UP
SODIUM SERPL-SCNC: 134 MMOL/L — LOW (ref 135–145)
SODIUM SERPL-SCNC: 135 MMOL/L — SIGNIFICANT CHANGE UP (ref 135–145)
SODIUM SERPL-SCNC: 138 MMOL/L — SIGNIFICANT CHANGE UP (ref 135–145)
SP GR SPEC: 1.02 — SIGNIFICANT CHANGE UP (ref 1.01–1.05)
T PALLIDUM AB TITR SER: NEGATIVE — SIGNIFICANT CHANGE UP
URATE SERPL-MCNC: 5.5 MG/DL — SIGNIFICANT CHANGE UP (ref 2.5–7)
URATE SERPL-MCNC: 5.7 MG/DL — SIGNIFICANT CHANGE UP (ref 2.5–7)
URATE SERPL-MCNC: 6 MG/DL — SIGNIFICANT CHANGE UP (ref 2.5–7)
UROBILINOGEN FLD QL: SIGNIFICANT CHANGE UP
WBC # BLD: 10.79 K/UL — HIGH (ref 3.8–10.5)
WBC # BLD: 15.36 K/UL — HIGH (ref 3.8–10.5)
WBC # BLD: 9.95 K/UL — SIGNIFICANT CHANGE UP (ref 3.8–10.5)
WBC # FLD AUTO: 10.79 K/UL — HIGH (ref 3.8–10.5)
WBC # FLD AUTO: 15.36 K/UL — HIGH (ref 3.8–10.5)
WBC # FLD AUTO: 9.95 K/UL — SIGNIFICANT CHANGE UP (ref 3.8–10.5)
WBC UR QL: 1 /HPF — SIGNIFICANT CHANGE UP (ref 0–5)

## 2023-02-13 PROCEDURE — 59514 CESAREAN DELIVERY ONLY: CPT | Mod: U7

## 2023-02-13 PROCEDURE — 88307 TISSUE EXAM BY PATHOLOGIST: CPT | Mod: 26

## 2023-02-13 RX ORDER — SODIUM CHLORIDE 9 MG/ML
300 INJECTION INTRAMUSCULAR; INTRAVENOUS; SUBCUTANEOUS ONCE
Refills: 0 | Status: COMPLETED | OUTPATIENT
Start: 2023-02-13 | End: 2023-02-13

## 2023-02-13 RX ORDER — CHLORHEXIDINE GLUCONATE 213 G/1000ML
1 SOLUTION TOPICAL ONCE
Refills: 0 | Status: DISCONTINUED | OUTPATIENT
Start: 2023-02-13 | End: 2023-02-14

## 2023-02-13 RX ORDER — SODIUM CHLORIDE 9 MG/ML
1000 INJECTION INTRAMUSCULAR; INTRAVENOUS; SUBCUTANEOUS
Refills: 0 | Status: DISCONTINUED | OUTPATIENT
Start: 2023-02-13 | End: 2023-02-14

## 2023-02-13 RX ORDER — AMPICILLIN TRIHYDRATE 250 MG
1 CAPSULE ORAL EVERY 4 HOURS
Refills: 0 | Status: DISCONTINUED | OUTPATIENT
Start: 2023-02-13 | End: 2023-02-14

## 2023-02-13 RX ORDER — SODIUM CHLORIDE 9 MG/ML
300 INJECTION INTRAMUSCULAR; INTRAVENOUS; SUBCUTANEOUS ONCE
Refills: 0 | Status: DISCONTINUED | OUTPATIENT
Start: 2023-02-13 | End: 2023-02-13

## 2023-02-13 RX ORDER — SODIUM CHLORIDE 9 MG/ML
1000 INJECTION, SOLUTION INTRAVENOUS
Refills: 0 | Status: DISCONTINUED | OUTPATIENT
Start: 2023-02-13 | End: 2023-02-14

## 2023-02-13 RX ORDER — CITRIC ACID/SODIUM CITRATE 300-500 MG
15 SOLUTION, ORAL ORAL EVERY 6 HOURS
Refills: 0 | Status: DISCONTINUED | OUTPATIENT
Start: 2023-02-13 | End: 2023-02-14

## 2023-02-13 RX ORDER — OXYTOCIN 10 UNIT/ML
VIAL (ML) INJECTION
Qty: 30 | Refills: 0 | Status: DISCONTINUED | OUTPATIENT
Start: 2023-02-13 | End: 2023-02-14

## 2023-02-13 RX ORDER — AMPICILLIN TRIHYDRATE 250 MG
2 CAPSULE ORAL ONCE
Refills: 0 | Status: COMPLETED | OUTPATIENT
Start: 2023-02-13 | End: 2023-02-13

## 2023-02-13 RX ORDER — NIFEDIPINE 30 MG
30 TABLET, EXTENDED RELEASE 24 HR ORAL DAILY
Refills: 0 | Status: DISCONTINUED | OUTPATIENT
Start: 2023-02-13 | End: 2023-02-14

## 2023-02-13 RX ORDER — OXYTOCIN 10 UNIT/ML
333.33 VIAL (ML) INJECTION
Qty: 20 | Refills: 0 | Status: COMPLETED | OUTPATIENT
Start: 2023-02-13 | End: 2023-02-13

## 2023-02-13 RX ADMIN — Medication 108 GRAM(S): at 11:05

## 2023-02-13 RX ADMIN — Medication 108 GRAM(S): at 15:08

## 2023-02-13 RX ADMIN — SODIUM CHLORIDE 125 MILLILITER(S): 9 INJECTION, SOLUTION INTRAVENOUS at 19:13

## 2023-02-13 RX ADMIN — SODIUM CHLORIDE 300 MILLILITER(S): 9 INJECTION INTRAMUSCULAR; INTRAVENOUS; SUBCUTANEOUS at 21:17

## 2023-02-13 RX ADMIN — Medication 0.25 MILLIGRAM(S): at 20:09

## 2023-02-13 RX ADMIN — Medication 108 GRAM(S): at 19:13

## 2023-02-13 RX ADMIN — FAMOTIDINE 20 MILLIGRAM(S): 10 INJECTION INTRAVENOUS at 23:30

## 2023-02-13 RX ADMIN — Medication 200 GRAM(S): at 07:06

## 2023-02-13 RX ADMIN — Medication 108 GRAM(S): at 22:59

## 2023-02-13 RX ADMIN — Medication 30 MILLIGRAM(S): at 06:02

## 2023-02-13 RX ADMIN — SODIUM CHLORIDE 125 MILLILITER(S): 9 INJECTION, SOLUTION INTRAVENOUS at 07:06

## 2023-02-13 RX ADMIN — SODIUM CHLORIDE 125 MILLILITER(S): 9 INJECTION INTRAMUSCULAR; INTRAVENOUS; SUBCUTANEOUS at 21:48

## 2023-02-13 RX ADMIN — Medication 30 MILLILITER(S): at 23:30

## 2023-02-13 RX ADMIN — SODIUM CHLORIDE 125 MILLILITER(S): 9 INJECTION, SOLUTION INTRAVENOUS at 17:43

## 2023-02-13 NOTE — OB RN TRIAGE NOTE - FALL HARM RISK - UNIVERSAL INTERVENTIONS
Bed in lowest position, wheels locked, appropriate side rails in place/Call bell, personal items and telephone in reach/Instruct patient to call for assistance before getting out of bed or chair/Non-slip footwear when patient is out of bed/[place] to call system/Physically safe environment - no spills, clutter or unnecessary equipment/Purposeful Proactive Rounding/Room/bathroom lighting operational, light cord in reach

## 2023-02-13 NOTE — OB PROVIDER LABOR PROGRESS NOTE - ASSESSMENT
PROM, doing well, overall reassuring fetal status  Pain mgt PRN  Consider Pitocin if not adequate  Frequent position change  Reassess in 1-2 hrs/prn  Dr. Philip updated  TRISTA Benitez
Plan: 27y y/o  @ 36w1d in stable condition  - Con't IOL with one more dose of cytotec and transition to pitocin  - Continuous EFM, [place]  - Con't IVF    d/w attending physician Dr. Paco Cox MD  PGY-1
A/P:   - Labor:  IOL for PPROM. Currently on PO cytotec, continue PO. Cervical balloon placed with no issues, tolerated well.  - Fetus: cat 2  - GBS: unknown on amp  - Pain: no epidural yet    Ines Rain, PGY-1

## 2023-02-13 NOTE — CHART NOTE - NSCHARTNOTEFT_GEN_A_CORE
R1 Chart Note     IOL for PPROM, with hx of gHTN now meeting criteria for PEC by P/C 0.3.    Today, patient denies HA, vision changes, CP, SOB, N/V, RUQ pain. She is feeling overall well.    Vital Signs Last 24 Hrs  T(C): 36.7 (23 @ 07:19), Max: 36.7 (23 @ 05:13)  T(F): 98.1 (23 @ 07:19), Max: 98.1 (23 @ 05:31)  HR: 90 (23 @ 07:19) (88 - 112)  BP: 147/91 (23 @ 07:19) (134/88 - 161/107)  RR: 16 (23 @ 07:19) (16 - 18)    HELLP wnl, P/C 0.3    A/P:  - spoke with patient, discussed diagnosis and all questions answered  - denies severe features  - continue home Procardia 30  - HELLP labs q6h before epi, q12h after epi  - Continue to monitor    L Panella PGY1

## 2023-02-13 NOTE — OB PROVIDER H&P - ASSESSMENT
28 yo , DAVE@36  weeks rupture of membrane  0550 discuss with Dr. Cleary  Patient admitted for rupture of membranes  procardia XL 30mg po ordered for now  For PO Cytotec  GBS unknown-GBS Prophylaxis   For epi PRN  routine orders  meds ordered  covid pcr sent  consents signed by patient.    JIMMY brice NP 28 yo , DAVE@36 / weeks rupture of membrane  0550 discuss with Dr. Cleary  Patient admitted for rupture of membranes  procardia XL 30mg po ordered for now  For PO Cytotec  GBS unknown-GBS Prophylaxis   For epi PRN  routine orders  meds ordered  covid pcr sent  consents signed by patient.  [id number] Discuss with Dr. Limon PGY-2    JIMMY brice NP

## 2023-02-13 NOTE — OB PROVIDER H&P - NSHPPHYSICALEXAM_GEN_ALL_CORE
Vital Signs Last 24 Hrs  T(C): 36.7 (13 Feb 2023 05:31), Max: 36.7 (13 Feb 2023 05:13)  T(F): 98.1 (13 Feb 2023 05:31), Max: 98.1 (13 Feb 2023 05:31)  HR: 96 (13 Feb 2023 06:05) (90 - 112)  BP: 153/99 (13 Feb 2023 05:55) (134/88 - 161/107)  RR: 18 (13 Feb 2023 05:31) (18 - 18)        Gen: NAD  Head: NC/AT  Cardio: S1S2+, RRR  Resp: CTABL, no wheezing  Abdomen: Soft, NT/ND, BS+  Extremities: No LE edema bilaterally    NST and BPP done to assess fetal surveillance and results as follows:  NST-->FHR: 150 HR baseline, moderate variability, accelerations present, no decelerations, Category 1.  [place]: Contractions present Q3-4 mns  TAUS:vertex confirmed by bedside sonogram  SSE: large fluid pooling noted clear, positive Nitrazine, positive fern,  SVE: 1/40/-3

## 2023-02-13 NOTE — OB PROVIDER H&P - HISTORY OF PRESENT ILLNESS
28 yo , EGA@36 1/7 weeks, presented to D&T with c/o vaginal leakage of fluid since 0430AM clear, and vaginal spotting, denies contractions,  and reports positive fetal movement.  Denies fever, chills, headaches, changes in vision, chest pain, palpitations, shortness of breath, cough, nausea, vomiting, diarrhea, constipation, urinary symptoms, edema.    Prenatal care with Dr. Calixto  Prenatal course is complicated by GHTN    GBS status is unknown  Patient denies signs and symptoms of COVID 19; denies symptomatic illness; fully vaccinated.    No adverse reactions to anesthesia, no objections to blood transfusions if clinically indicated.  OB hx: primigravida  Med hx: denies  Surg hx: denies  GYN hx: denies hx of abnormal papsmear/cysts/fibroids/STDs  Meds:   ASA 81 mg po daily  Procardia 30MG XL po daily last dose 0840AM 23  PNV  Allergies: NKDA    Social hx: Denies alcohol, tobacco, drug use  Psych hx: denies hx of anxiety/depression

## 2023-02-13 NOTE — OB RN PATIENT PROFILE - HAVE YOU RECEIVED AT LEAST TWO PFIZER AND/OR MODERNA VACCINATIONS (IN ANY COMBINATION) AND/OR ONE JOHNSON & JOHNSON VACCINATION?
Resume a normal diet today  Resume normal activity tomorrow  Please contact the office with nausea, vomiting, fevers and chills. Also, contact the office with abdominal pain.
Yes

## 2023-02-13 NOTE — OB PROVIDER LABOR PROGRESS NOTE - NS_SUBJECTIVE/OBJECTIVE_OBGYN_ALL_OB_FT
PTA late documentation  Tracing reviewed with , nursing, and chief resident  Patient is now with IUPC and ISE and has been 5 cm since 7pm  Tracing intermittently category 2 tracing for several hours    SVE: 5/80/0 With cervial swelling  CNX q2-3  BSS: direct OP head position     IOL for PPROM category 2 tracing  Patient placed in superman position to turn the babies head to OA  Continue monitoring  FHR tracing and possible future indications for primary C/S discussed with patient and 
Patient seen and examined- for spontaneous decel  Patient states she is feeling well with epidural    Patient turned to left lateral and then right lateral with O2 and fluid bolus  FH noted to return to baseline    EFM: Cat2  CNX: q4-5  SVE: 4/70/+1    Plan  Patient to remain on right lateral side  Hold all induction agents at this time
Primary  note  Patient now with recurrent late decelerations and progressively decreasing variability all worsening over the last hour.  Patient and support counseled on the strong recommendation of a primary     SVE: /0, still in OP position    Plan   IOL for PPROM now for C/Section for category 2 tracing remote from delivery  Patient and support counseled on the R/B/A and recovery of primary  and all patient questions answered to apparent satisfaction  Nursing, anesthesia, resident team made aware and transport expedited
R1 Labor & Delivery Progress Note     T(C): 37.1 (23 @ 20:24), Max: 37.1 (23 @ 06:45)  HR: 124 (23 @ 21:16) (68 - 140)  BP: 115/59 (23 @ 21:14) (112/57 - 161/107)  RR: 16 (23 @ 17:02) (16 - 18)  SpO2: 100% (23 @ 21:16) (93% - 100%)    Plan: 27y y/o  @36w1d in stable condition evaluated at bedside for recurrent variable decels. Patient repositioned and amnioinfusion to be started.     - Con't IOL  - Continuous EFM, [place]  - Con't IVF    d/w attending physician Dr. Paco Prince  PGY-1
Pt seen and examined for IUPC placement, difficulty monitoring ctx. PROM IOL, not currently receiving any agent. coping well with ctx pain, declines intervention at this time  Cat 2 FHR, normal baseline with moderate variability with occ prolonged variable deceleration  VE: 5/80/1, IUPC placed without difficulty
R1 Labor & Delivery Progress Note     Pt seen & examined at bedside for cervical exam. CRB out of uterus and sitting in vagina. Removed without difficulty.    T(C): 36.8 (02-13-23 @ 13:15), Max: 37.1 (02-13-23 @ 06:45)  HR: 99 (02-13-23 @ 14:56) (68 - 125)  BP: 130/76 (02-13-23 @ 14:43) (114/61 - 161/107)  RR: 16 (02-13-23 @ 13:15) (16 - 18)  SpO2: 99% (02-13-23 @ 14:56) (93% - 100%)
R1 OB Labor Note    S: Patient seen and examined at bedside. Cervical balloon placed with no issues.    T(C): 37.0 (02-13-23 @ 11:15), Max: 37.1 (02-13-23 @ 06:45)  HR: 118 (02-13-23 @ 12:28) (68 - 125)  BP: 126/79 (02-13-23 @ 12:12) (114/61 - 161/107)  RR: 16 (02-13-23 @ 11:15) (16 - 18)  SpO2: 98% (02-13-23 @ 12:28) (93% - 100%)

## 2023-02-13 NOTE — OB RN PATIENT PROFILE - PAIN RATING/NUMBER SCALE (0-10): ACTIVITY
Received POA for Healthcare.  Paperwork sent to scanning.   0 (no pain/absence of nonverbal indicators of pain)

## 2023-02-13 NOTE — OB PROVIDER H&P - NSLOWPPHRISK_OBGYN_A_OB
No previous uterine incision/[facility] Pregnancy/Less than or equal to 4 previous vaginal births/No known bleeding disorder/No history of postpartum hemorrhage/No other PPH risks indicated

## 2023-02-13 NOTE — OB RN PATIENT PROFILE - NS TRANSFER PATIENT BELONGINGS
Cell Phone/PDA (specify)/Jewelry/Money (specify) Cell Phone/PDA (specify)/Jewelry/Money (specify)/Clothing

## 2023-02-14 LAB
ALBUMIN SERPL ELPH-MCNC: 2.9 G/DL — LOW (ref 3.3–5)
ALP SERPL-CCNC: 84 U/L — SIGNIFICANT CHANGE UP (ref 40–120)
ALT FLD-CCNC: 11 U/L — SIGNIFICANT CHANGE UP (ref 4–33)
ANION GAP SERPL CALC-SCNC: 12 MMOL/L — SIGNIFICANT CHANGE UP (ref 7–14)
ANISOCYTOSIS BLD QL: SLIGHT — SIGNIFICANT CHANGE UP
APTT BLD: 24.6 SEC — LOW (ref 27–36.3)
AST SERPL-CCNC: 15 U/L — SIGNIFICANT CHANGE UP (ref 4–32)
BASOPHILS # BLD AUTO: 0 K/UL — SIGNIFICANT CHANGE UP (ref 0–0.2)
BASOPHILS # BLD AUTO: 0.05 K/UL — SIGNIFICANT CHANGE UP (ref 0–0.2)
BASOPHILS NFR BLD AUTO: 0 % — SIGNIFICANT CHANGE UP (ref 0–2)
BASOPHILS NFR BLD AUTO: 0.2 % — SIGNIFICANT CHANGE UP (ref 0–2)
BILIRUB SERPL-MCNC: <0.2 MG/DL — SIGNIFICANT CHANGE UP (ref 0.2–1.2)
BUN SERPL-MCNC: 5 MG/DL — LOW (ref 7–23)
CALCIUM SERPL-MCNC: 8.5 MG/DL — SIGNIFICANT CHANGE UP (ref 8.4–10.5)
CHLORIDE SERPL-SCNC: 102 MMOL/L — SIGNIFICANT CHANGE UP (ref 98–107)
CO2 SERPL-SCNC: 21 MMOL/L — LOW (ref 22–31)
COVID-19 SPIKE DOMAIN AB INTERP: POSITIVE
COVID-19 SPIKE DOMAIN ANTIBODY RESULT: >250 U/ML — HIGH
CREAT SERPL-MCNC: 0.43 MG/DL — LOW (ref 0.5–1.3)
EGFR: 137 ML/MIN/1.73M2 — SIGNIFICANT CHANGE UP
EOSINOPHIL # BLD AUTO: 0 K/UL — SIGNIFICANT CHANGE UP (ref 0–0.5)
EOSINOPHIL # BLD AUTO: 0 K/UL — SIGNIFICANT CHANGE UP (ref 0–0.5)
EOSINOPHIL NFR BLD AUTO: 0 % — SIGNIFICANT CHANGE UP (ref 0–6)
EOSINOPHIL NFR BLD AUTO: 0 % — SIGNIFICANT CHANGE UP (ref 0–6)
FIBRINOGEN PPP-MCNC: 526 MG/DL — HIGH (ref 200–465)
GLUCOSE SERPL-MCNC: 116 MG/DL — HIGH (ref 70–99)
HCT VFR BLD CALC: 30.3 % — LOW (ref 34.5–45)
HCT VFR BLD CALC: 30.9 % — LOW (ref 34.5–45)
HGB BLD-MCNC: 10.1 G/DL — LOW (ref 11.5–15.5)
HGB BLD-MCNC: 10.3 G/DL — LOW (ref 11.5–15.5)
HYPOCHROMIA BLD QL: SLIGHT — SIGNIFICANT CHANGE UP
IANC: 19.66 K/UL — HIGH (ref 1.8–7.4)
IANC: 21.49 K/UL — HIGH (ref 1.8–7.4)
IMM GRANULOCYTES NFR BLD AUTO: 1.1 % — HIGH (ref 0–0.9)
INR BLD: 1.02 RATIO — SIGNIFICANT CHANGE UP (ref 0.88–1.16)
LDH SERPL L TO P-CCNC: 182 U/L — SIGNIFICANT CHANGE UP (ref 135–225)
LYMPHOCYTES # BLD AUTO: 0.58 K/UL — LOW (ref 1–3.3)
LYMPHOCYTES # BLD AUTO: 1.63 K/UL — SIGNIFICANT CHANGE UP (ref 1–3.3)
LYMPHOCYTES # BLD AUTO: 2.6 % — LOW (ref 13–44)
LYMPHOCYTES # BLD AUTO: 6.6 % — LOW (ref 13–44)
MACROCYTES BLD QL: SLIGHT — SIGNIFICANT CHANGE UP
MANUAL SMEAR VERIFICATION: SIGNIFICANT CHANGE UP
MCHC RBC-ENTMCNC: 28.9 PG — SIGNIFICANT CHANGE UP (ref 27–34)
MCHC RBC-ENTMCNC: 30.1 PG — SIGNIFICANT CHANGE UP (ref 27–34)
MCHC RBC-ENTMCNC: 33.3 GM/DL — SIGNIFICANT CHANGE UP (ref 32–36)
MCHC RBC-ENTMCNC: 33.3 GM/DL — SIGNIFICANT CHANGE UP (ref 32–36)
MCV RBC AUTO: 86.6 FL — SIGNIFICANT CHANGE UP (ref 80–100)
MCV RBC AUTO: 90.4 FL — SIGNIFICANT CHANGE UP (ref 80–100)
MONOCYTES # BLD AUTO: 0.4 K/UL — SIGNIFICANT CHANGE UP (ref 0–0.9)
MONOCYTES # BLD AUTO: 1.13 K/UL — HIGH (ref 0–0.9)
MONOCYTES NFR BLD AUTO: 1.8 % — LOW (ref 2–14)
MONOCYTES NFR BLD AUTO: 4.6 % — SIGNIFICANT CHANGE UP (ref 2–14)
NEUTROPHILS # BLD AUTO: 21.2 K/UL — HIGH (ref 1.8–7.4)
NEUTROPHILS # BLD AUTO: 21.49 K/UL — HIGH (ref 1.8–7.4)
NEUTROPHILS NFR BLD AUTO: 83.2 % — HIGH (ref 43–77)
NEUTROPHILS NFR BLD AUTO: 87.5 % — HIGH (ref 43–77)
NEUTS BAND # BLD: 11.5 % — CRITICAL HIGH (ref 0–6)
NRBC # BLD: 0 /100 WBCS — SIGNIFICANT CHANGE UP (ref 0–0)
NRBC # FLD: 0 K/UL — SIGNIFICANT CHANGE UP (ref 0–0)
PLAT MORPH BLD: ABNORMAL
PLATELET # BLD AUTO: 175 K/UL — SIGNIFICANT CHANGE UP (ref 150–400)
PLATELET # BLD AUTO: 185 K/UL — SIGNIFICANT CHANGE UP (ref 150–400)
PLATELET COUNT - ESTIMATE: NORMAL — SIGNIFICANT CHANGE UP
POTASSIUM SERPL-MCNC: 4 MMOL/L — SIGNIFICANT CHANGE UP (ref 3.5–5.3)
POTASSIUM SERPL-SCNC: 4 MMOL/L — SIGNIFICANT CHANGE UP (ref 3.5–5.3)
PROT SERPL-MCNC: 5.5 G/DL — LOW (ref 6–8.3)
PROTHROM AB SERPL-ACNC: 11.8 SEC — SIGNIFICANT CHANGE UP (ref 10.5–13.4)
RBC # BLD: 3.35 M/UL — LOW (ref 3.8–5.2)
RBC # BLD: 3.57 M/UL — LOW (ref 3.8–5.2)
RBC # FLD: 13.4 % — SIGNIFICANT CHANGE UP (ref 10.3–14.5)
RBC # FLD: 13.6 % — SIGNIFICANT CHANGE UP (ref 10.3–14.5)
RBC BLD AUTO: SIGNIFICANT CHANGE UP
SARS-COV-2 IGG+IGM SERPL QL IA: >250 U/ML — HIGH
SARS-COV-2 IGG+IGM SERPL QL IA: POSITIVE
SODIUM SERPL-SCNC: 135 MMOL/L — SIGNIFICANT CHANGE UP (ref 135–145)
URATE SERPL-MCNC: 5.3 MG/DL — SIGNIFICANT CHANGE UP (ref 2.5–7)
VARIANT LYMPHS # BLD: 0.9 % — SIGNIFICANT CHANGE UP (ref 0–6)
WBC # BLD: 22.39 K/UL — HIGH (ref 3.8–10.5)
WBC # BLD: 24.56 K/UL — HIGH (ref 3.8–10.5)
WBC # FLD AUTO: 22.39 K/UL — HIGH (ref 3.8–10.5)
WBC # FLD AUTO: 24.56 K/UL — HIGH (ref 3.8–10.5)

## 2023-02-14 RX ORDER — LANOLIN
1 OINTMENT (GRAM) TOPICAL EVERY 6 HOURS
Refills: 0 | Status: DISCONTINUED | OUTPATIENT
Start: 2023-02-14 | End: 2023-02-16

## 2023-02-14 RX ORDER — TETANUS TOXOID, REDUCED DIPHTHERIA TOXOID AND ACELLULAR PERTUSSIS VACCINE, ADSORBED 5; 2.5; 8; 8; 2.5 [IU]/.5ML; [IU]/.5ML; UG/.5ML; UG/.5ML; UG/.5ML
0.5 SUSPENSION INTRAMUSCULAR ONCE
Refills: 0 | Status: DISCONTINUED | OUTPATIENT
Start: 2023-02-14 | End: 2023-02-16

## 2023-02-14 RX ORDER — IBUPROFEN 200 MG
600 TABLET ORAL EVERY 6 HOURS
Refills: 0 | Status: COMPLETED | OUTPATIENT
Start: 2023-02-14 | End: 2024-01-13

## 2023-02-14 RX ORDER — SODIUM CHLORIDE 9 MG/ML
1000 INJECTION, SOLUTION INTRAVENOUS
Refills: 0 | Status: DISCONTINUED | OUTPATIENT
Start: 2023-02-14 | End: 2023-02-14

## 2023-02-14 RX ORDER — OXYCODONE HYDROCHLORIDE 5 MG/1
10 TABLET ORAL
Refills: 0 | Status: DISCONTINUED | OUTPATIENT
Start: 2023-02-14 | End: 2023-02-14

## 2023-02-14 RX ORDER — NALOXONE HYDROCHLORIDE 4 MG/.1ML
0.1 SPRAY NASAL
Refills: 0 | Status: DISCONTINUED | OUTPATIENT
Start: 2023-02-14 | End: 2023-02-16

## 2023-02-14 RX ORDER — NALBUPHINE HYDROCHLORIDE 10 MG/ML
2.5 INJECTION, SOLUTION INTRAMUSCULAR; INTRAVENOUS; SUBCUTANEOUS EVERY 6 HOURS
Refills: 0 | Status: DISCONTINUED | OUTPATIENT
Start: 2023-02-14 | End: 2023-02-16

## 2023-02-14 RX ORDER — OXYCODONE HYDROCHLORIDE 5 MG/1
5 TABLET ORAL ONCE
Refills: 0 | Status: DISCONTINUED | OUTPATIENT
Start: 2023-02-14 | End: 2023-02-16

## 2023-02-14 RX ORDER — ONDANSETRON 8 MG/1
4 TABLET, FILM COATED ORAL ONCE
Refills: 0 | Status: DISCONTINUED | OUTPATIENT
Start: 2023-02-14 | End: 2023-02-14

## 2023-02-14 RX ORDER — SODIUM CHLORIDE 9 MG/ML
1000 INJECTION, SOLUTION INTRAVENOUS
Refills: 0 | Status: DISCONTINUED | OUTPATIENT
Start: 2023-02-14 | End: 2023-02-15

## 2023-02-14 RX ORDER — DEXAMETHASONE 0.5 MG/5ML
4 ELIXIR ORAL EVERY 6 HOURS
Refills: 0 | Status: DISCONTINUED | OUTPATIENT
Start: 2023-02-14 | End: 2023-02-16

## 2023-02-14 RX ORDER — KETOROLAC TROMETHAMINE 30 MG/ML
30 SYRINGE (ML) INJECTION EVERY 6 HOURS
Refills: 0 | Status: DISCONTINUED | OUTPATIENT
Start: 2023-02-14 | End: 2023-02-15

## 2023-02-14 RX ORDER — ONDANSETRON 8 MG/1
4 TABLET, FILM COATED ORAL EVERY 6 HOURS
Refills: 0 | Status: DISCONTINUED | OUTPATIENT
Start: 2023-02-14 | End: 2023-02-16

## 2023-02-14 RX ORDER — NIFEDIPINE 30 MG
30 TABLET, EXTENDED RELEASE 24 HR ORAL DAILY
Refills: 0 | Status: DISCONTINUED | OUTPATIENT
Start: 2023-02-14 | End: 2023-02-16

## 2023-02-14 RX ORDER — DIPHENHYDRAMINE HCL 50 MG
25 CAPSULE ORAL EVERY 6 HOURS
Refills: 0 | Status: DISCONTINUED | OUTPATIENT
Start: 2023-02-14 | End: 2023-02-16

## 2023-02-14 RX ORDER — MAGNESIUM HYDROXIDE 400 MG/1
30 TABLET, CHEWABLE ORAL
Refills: 0 | Status: DISCONTINUED | OUTPATIENT
Start: 2023-02-14 | End: 2023-02-16

## 2023-02-14 RX ORDER — HEPARIN SODIUM 5000 [USP'U]/ML
5000 INJECTION INTRAVENOUS; SUBCUTANEOUS EVERY 12 HOURS
Refills: 0 | Status: DISCONTINUED | OUTPATIENT
Start: 2023-02-14 | End: 2023-02-16

## 2023-02-14 RX ORDER — SIMETHICONE 80 MG/1
80 TABLET, CHEWABLE ORAL EVERY 4 HOURS
Refills: 0 | Status: DISCONTINUED | OUTPATIENT
Start: 2023-02-14 | End: 2023-02-16

## 2023-02-14 RX ORDER — OXYCODONE HYDROCHLORIDE 5 MG/1
5 TABLET ORAL
Refills: 0 | Status: DISCONTINUED | OUTPATIENT
Start: 2023-02-14 | End: 2023-02-16

## 2023-02-14 RX ORDER — FAMOTIDINE 10 MG/ML
20 INJECTION INTRAVENOUS ONCE
Refills: 0 | Status: COMPLETED | OUTPATIENT
Start: 2023-02-14 | End: 2023-02-13

## 2023-02-14 RX ORDER — ACETAMINOPHEN 500 MG
975 TABLET ORAL
Refills: 0 | Status: DISCONTINUED | OUTPATIENT
Start: 2023-02-14 | End: 2023-02-16

## 2023-02-14 RX ORDER — OXYTOCIN 10 UNIT/ML
333.33 VIAL (ML) INJECTION
Qty: 20 | Refills: 0 | Status: DISCONTINUED | OUTPATIENT
Start: 2023-02-14 | End: 2023-02-15

## 2023-02-14 RX ORDER — CITRIC ACID/SODIUM CITRATE 300-500 MG
30 SOLUTION, ORAL ORAL ONCE
Refills: 0 | Status: COMPLETED | OUTPATIENT
Start: 2023-02-14 | End: 2023-02-13

## 2023-02-14 RX ORDER — OXYCODONE HYDROCHLORIDE 5 MG/1
5 TABLET ORAL
Refills: 0 | Status: DISCONTINUED | OUTPATIENT
Start: 2023-02-14 | End: 2023-02-14

## 2023-02-14 RX ADMIN — Medication 975 MILLIGRAM(S): at 21:04

## 2023-02-14 RX ADMIN — HEPARIN SODIUM 5000 UNIT(S): 5000 INJECTION INTRAVENOUS; SUBCUTANEOUS at 06:14

## 2023-02-14 RX ADMIN — Medication 1000 MILLIUNIT(S)/MIN: at 08:00

## 2023-02-14 RX ADMIN — Medication 4 MILLIGRAM(S): at 11:15

## 2023-02-14 RX ADMIN — Medication 30 MILLIGRAM(S): at 12:30

## 2023-02-14 RX ADMIN — Medication 1000 MILLIUNIT(S)/MIN: at 03:14

## 2023-02-14 RX ADMIN — Medication 30 MILLIGRAM(S): at 06:27

## 2023-02-14 RX ADMIN — HEPARIN SODIUM 5000 UNIT(S): 5000 INJECTION INTRAVENOUS; SUBCUTANEOUS at 17:25

## 2023-02-14 RX ADMIN — Medication 30 MILLIGRAM(S): at 17:25

## 2023-02-14 RX ADMIN — Medication 975 MILLIGRAM(S): at 21:34

## 2023-02-14 RX ADMIN — Medication 30 MILLIGRAM(S): at 06:14

## 2023-02-14 RX ADMIN — Medication 975 MILLIGRAM(S): at 08:03

## 2023-02-14 RX ADMIN — ONDANSETRON 4 MILLIGRAM(S): 8 TABLET, FILM COATED ORAL at 09:06

## 2023-02-14 RX ADMIN — Medication 30 MILLIGRAM(S): at 06:15

## 2023-02-14 RX ADMIN — Medication 30 MILLIGRAM(S): at 18:06

## 2023-02-14 RX ADMIN — Medication 30 MILLIGRAM(S): at 11:40

## 2023-02-14 RX ADMIN — Medication 975 MILLIGRAM(S): at 08:45

## 2023-02-14 NOTE — LACTATION INITIAL EVALUATION - POTENTIAL FOR
ineffective breastfeeding/engorgement/knowledge deficit/plugged ducts/latch on difficulty/delayed secretory activation

## 2023-02-14 NOTE — OB PROVIDER DELIVERY SUMMARY - NSPROVIDERDELIVERYNOTE_OBGYN_ALL_OB_FT
Viable infant, Vertex presentation w/ nuchal x1 and cord around body, APGARS 9/9  Grossly normal uterus, tubes, and ovaries   Hysterotomy closed in x2 layers (2nd layer in a horizontal fashion) w/ 1-0 Caprosyn. First layer in a running locked fashion. Right sided extension w/ small 1x1.5cm BLZAE hematoma noted (stable). Right-sided extension repaired w/ 2-0 Vicryl w/ [id number]   Rectus muscle reapproximated with 2-0 Vicryl   Fascia reapproximated w/ 1-0 Vicryl in a running fashion  SubQ reapproximated w/ 2-0 plain gut in an interrupted fashion   Skin reapproximated w/ 3-0 Monocryl in a running fashion     835/1.2L/90cc    Of note, patient with severe range pressures while intra-op. Will obtain STAT HELLP labs while in the PACU prior to epi removal and for further evaluation     Dictation #: Viable female infant, Vertex presentation w/ nuchal x1 and cord around body, APGARS 9/9/. Weight 2230g  Grossly normal uterus, tubes, and ovaries   Hysterotomy closed in x2 layers (2nd layer in a horizontal fashion) w/ 1-0 Caprosyn. First layer in a running locked fashion. Right sided extension w/ small 1x1.5cm BLAZE hematoma noted (stable). Right-sided extension repaired w/ 2-0 Vicryl w/ [id number]   Rectus muscle reapproximated with 2-0 Vicryl in a running fashion  Fascia reapproximated w/ 0-0 Vicryl in a running fashion  SubQ reapproximated w/ 2-0 plain gut in an interrupted fashion   Skin reapproximated w/ 3-0 Monocryl in a running fashion     835/1.2L/90cc    Of note, patient with severe range pressures while intra-op. Will obtain STAT HELLP labs while in the PACU prior to epi removal and for further evaluation     Dictation #: [id number]

## 2023-02-14 NOTE — OB NEONATOLOGY/PEDIATRICIAN DELIVERY SUMMARY - NSPEDSNEONOTESA_OBGYN_ALL_OB_FT
Peds called to delivery for category II tracing. 36.1 wk female born via urgent CS to a 25 y/o  mother. Maternal ob/gyn hx of gestational hypertension, developed PEC without severe features on nifedipine. Maternal labs include Blood Type A+, HIV -, RPR NR, Rubella I, Hep B -, GBS unknown (received ampx6). SROM at 4:30 on  with clear fluids (ROM hours: 19H24M). Category 2 tracing with urgent C/S for NRFHR. Baby emerged vigorous, crying, was w/d/s/s with APGARS of 9/9. Mom plans to initiate breastfeeding, consents Hep B vaccine.  Highest maternal temp: 37.1. EOS 0.21. Admitted to NBN.    Physical Exam:  Gen: no acute distress, +grimace  HEENT:  anterior fontanel open soft and flat, nondysmorphic facies, no cleft lip/palate, ears normal set, no ear pits or tags, nares clinically patent  Resp: Normal respiratory effort without grunting or retractions, good air entry b/l, clear to auscultation bilaterally  Cardio: Present S1/S2, regular rate and rhythm, no murmurs  Abd: soft, non tender, non distended, umbilical cord with 3 vessels  Neuro: +palmar and plantar grasp, +suck, +mica, normal tone  Extremities: moving all extremities, no clavicular crepitus or stepoff  Skin: pink, warm  Genitals: Normal female anatomy, Won 1, anus patent

## 2023-02-14 NOTE — DISCHARGE NOTE OB - CARE PLAN
Principal Discharge DX:	Single delivery by  section  Assessment and plan of treatment:	- continue routine pain mgt.  - continue perineal care   - pelvic rest for 6 wk  - Patient to follow up in 2wk  - Please notify MD if you experience persistent and overwhelming emotions inhibiting daily activities or infant care, persistent headache, excessive vaginal bleeding, foul smelling vaginal discharge, Urinary urgency/frequency/burning, or localized lower extremity swelling/warmth/redness.   1 Principal Discharge DX:	Single delivery by  section  Assessment and plan of treatment:	- continue routine pain mgt.  - continue perineal care   - pelvic rest for 6 wk  - Patient to follow up in 2wk  - Please notify MD if you experience persistent and overwhelming emotions inhibiting daily activities or infant care, persistent headache, excessive vaginal bleeding, foul smelling vaginal discharge, Urinary urgency/frequency/burning, or localized lower extremity swelling/warmth/redness.  Secondary Diagnosis:	Preeclampsia  Assessment and plan of treatment:	Check blood pressures three times a day.  Keep log to bring in with visit.  Call office for blood pressures greater than 140s/90s.  Return to office for blood pressures more than 160/110 or experiencing headaches unrelieved with medications, blurry vision, right upper quadrant pain, or epigastric pain.  Follow up with MD in 3 days for BP check.   Continue procardia 30XL daily.  Do not take for blood pressures less than 100/60

## 2023-02-14 NOTE — OB RN INTRAOPERATIVE NOTE - NS_ANESTHESIAEND_OBGYN_ALL_OB_DT
Psychiatric Assessment    Patient: Jewel Kilgore Date: 2019   : 1982 Attending: No att. providers found   37 year old male      Chief complaint: \"I want to know what the hell is wrong with me.\"    History of Presenting Illness: Jewel is a 37-year-old  white male with a history of bipolar disorder. He was accompanied to this appointment by his wife Sandra in their 1-year-old daughter with his permission. The patient was extremely irritable and openly angry at being at this appointment. He states he's been to psychiatric provider since the age of 8\" none of them know what the hell is wrong with me.\" He states he's been diagnosis of bipolar disorder however when I described the symptoms of devon and asked whether he's had these he and his wife denied this. When I told him that in order to meet criteria for bipolar disorder he had a have a manic episode he blew up states that he doesn't trust me as [facility] diagnosed him with bipolar disorder and he \"believes them over me\" it took myself and his wife to de-escalate him until he calmed down. He reports impaired sleep and appetite. Concentration and energy are low. He hasn't been working since  of last year. He was diagnosed with MS and has been out of work ever since. His wife notes that he has been more depressed and irritable since he has been out of work and his diagnosis with MS. He states that his concentration and memory are horrible. I asked him what neurology has said about his prognosis and status of his MS and he accused them all of being \"idiots\" and not knowing what they're doing. Overall he seemed generally angry and irritable and didn't seem happy with what anybody was doing. He feels like a burden to his family as he is not working but denies suicidal and homicidal ideation. He becomes very irritable when the discussion of paranoia or hallucinations, and per chart review he has been very paranoid in  the past, particularly about the government. He seems suspicious and on trusting of me and wasn't willing to give out much information and rarely made eye contact with me. He admits that crowds of people get him anxiety and panic attacks. His wife admits that he is easily irritated and angered but she maintains she is not afraid of him at this time.    Past Psych History: As noted in the history of present illness including AODA loss of control and trauma.  Patient has been at [facility] about 7-9 years ago. Patient states that he has been seen psychiatrists since the age of 8 years old. He has been on multiple medications including antipsychotics, Ritalin, Adderall, SSRIs and SNRIs and all of these medications \"messed up my mind.\"    Psychiatric ROS:    Negative except for mentioned in history of present illness.    Past Medical and Current Status:   The following were reviewed in the electronic record as past history and active problems:  Active Ambulatory Problems     Diagnosis Date Noted   • History of suicidal ideation 06/19/2015   • ADD (attention deficit disorder)    • Bipolar disorder (CMS/[facility])    • Marijuana use    • Former smoker    • Wellness examination 03/30/2017   • Retrobulbar neuritis of left eye 12/20/2018   • Abnormal brain MRI 12/20/2018   • Multiple sclerosis (CMS/[facility]) 02/13/2019     Resolved Ambulatory Problems     Diagnosis Date Noted   • Rotator cuff syndrome of left shoulder 02/25/2016   • Biceps tendinitis on left 02/25/2016   • Cellulitis of left lower extremity 04/14/2016   • Foreign body of knee, left, superficial, infected 05/03/2016     Past Medical History:   Diagnosis Date   • ADD (attention deficit disorder)    • Bipolar disorder (CMS/[facility])    • Former smoker    • Marijuana use    • Suicidal ideation 6/19/2015     Patient Active Problem List   Diagnosis   • History of suicidal ideation   • ADD (attention deficit disorder)   • Bipolar disorder (CMS/[facility])   • Marijuana  use   • Former smoker   • Wellness examination   • Retrobulbar neuritis of left eye   • Abnormal brain MRI   • Multiple sclerosis (CMS/[facility])       Medications at Assessment: Prior to assessment medications were reviewed in the electronic record.  No current outpatient medications on file.     No current facility-administered medications for this visit.        Family History (Psych and pertinent Med):  Reviewed and updated in the electronic record.  Family diseases/traits and treatment: brother has bipolar disorder    Social History:    Social History     Tobacco Use   • Smoking status: Former Smoker     Packs/day: 0.50     Types: Cigarettes     Last attempt to quit: 2014     Years since quittin.8   • Smokeless tobacco: Never Used   Substance Use Topics   • Alcohol use: Yes     Alcohol/week: 0.0 oz     Comment: 0-3   • Drug use: Yes     Types: Marijuana     Comment: Reports daily marijuana use, \"multiple times a day.\" Patient uses \"Dab\" form of THC.     None/No Preference  Living Condition: Jewel completed high school but had to take learning disabled classes throughout his academic career. Patient had been working in the construction business for several years. He was forced to quit work due to his MS and trouble with concentration and memory. He is currently unemployed. He is  and he and his wife have a 1-year-old daughter. He smokes cannabis on occasion. Alcohol use is unknown. Denies legal history.   Social and Sexual History reviewed with the patient and updated in the electronic record.    Mental Status Exam:  Patient appears stated age. He was disheveled in appearance.  Patient is alert and fully oriented. No abnormal involuntary movements or gait issues noted. Speech is decreased prosody; occasionally shouting. Thought process is free of any thought blocking or perseveration. Patient is very guarded and paranoid; suspicious of me and of people in general. Patient’s perception did not reveal  hallucinations. Attention and concentration are unremarkable. Remote and immediate memories are intact. Insight and judgement are poor. Fund of knowledge is average. Patient denies suicidal or homicidal ideations. Mood is \"ok\" with very irritable affect.     Recent PHQ 2/9 Score    PHQ 2:  Date Adult PHQ 2 Score   9/12/2018 4       PHQ 9:  Date Adult PHQ 9 Score   9/12/2018 12       There were no vitals taken for this visit.    Diagnosis: F31.9 Bipolar 1 disorder (CMS/[facility])  (primary encounter diagnosis)   Axis II: deferred  Axis III: multiple sclerosis  Axis IV: unemployed; chronic mental illness  Axis V: GAF 30    Medical Decision Making/Plan of Treatment: At this point patient is very suspicious of me and doesn't appear to trust me. I did offer to set him up with another provider if he didn't feel comfortable seeing me and he exploded and said it would take \"4 months to be seen.\" It is also a conflict of interest for me to see him as his wife is my patient as well and they have quite a volatile relationship which she talks about in her appointments with me. I felt it best to end my appointment with him as he became very angry and I was essentially unable to explain my diagnoses or that seeing him was a conflict of interest and due to his agitated behavior I felt it was in the best interest of my safety and the safety of his family who were present to terminate the appointment early.  At this point I am unsure whether he will come back to see me. I am concerned about his mood and behavior not only from a mental health standpoint but also as multiple sclerosis exacerbates mental illness. I believe he should be following with neurology however he is taking no medication for his MS and has no appointments scheduled with them. He appears to have very little mary and trust in the neurologists he has seen and in the medical community in general. He did agree to [employer] testing and so that was performed. Individual  psychotherapy was offered however he declined, stating \"that is what work is for.\" Patient and his wife were given the opportunity to ask questions and they were answered in their entirety. I feel at this time he would be better served seeing another provider that he would hopefully better trust and avoid conflict of interest.      Treatment interval: 1/6.     Orders Placed This Encounter   • Miscellaneous Lab Order     Order Specific Question:   Test Name:     Answer:   genesight.       This information is confidential and disclosure without patient consent or statutory authorization is prohibited by law.    Rupa Acevedo, DO   14-Feb-2023 01:08

## 2023-02-14 NOTE — OB NEONATOLOGY/PEDIATRICIAN DELIVERY SUMMARY - APGAR COMPLETED BY
Shi VIRGEN/Pediatrician Alar Island Pedicle Flap Text: The defect edges were debeveled with a #15 scalpel blade.  Given the location of the defect, shape of the defect and the proximity to the alar rim an island pedicle advancement flap was deemed most appropriate.  Using a sterile surgical marker, an appropriate advancement flap was drawn incorporating the defect, outlining the appropriate donor tissue and placing the expected incisions within the nasal ala running parallel to the alar rim. The area thus outlined was incised with a #15 scalpel blade.  The skin margins were undermined minimally to an appropriate distance in all directions around the primary defect and laterally outward around the island pedicle utilizing iris scissors.  There was minimal undermining beneath the pedicle flap.

## 2023-02-14 NOTE — DISCHARGE NOTE OB - MEDICATION SUMMARY - MEDICATIONS TO TAKE
I will START or STAY ON the medications listed below when I get home from the hospital:    ibuprofen 600 mg oral tablet  -- 1 tab(s) by mouth every 6 hours, As Needed  -- Indication: For Pain    acetaminophen 325 mg oral tablet  -- 3 tab(s) by mouth every 6 hours, As Needed  -- Indication: For Pain    NIFEdipine 30 mg oral tablet, extended release  -- 1 tab(s) by mouth once a day  Do not take for blood pressures less than 100/60  -- Indication: For Preeclampsa    senna leaf extract oral tablet  -- 1 tab(s) by mouth 2 times a day, As needed, Constipation  -- Indication: For constipation

## 2023-02-14 NOTE — OB RN INTRAOPERATIVE NOTE - NSSELHIDDEN_OBGYN_ALL_OB_FT
[NS_[email or web address]_ALL_OB_FT:[id number]],[NS_DeliveryAssist1_OBGYN_ALL_OB_FT:[id number]],[NS_DeliveryRN_OBGYN_ALL_OB_FT:[id number]]

## 2023-02-14 NOTE — OB RN DELIVERY SUMMARY - NS_SEPSISRSKCALC_OBGYN_ALL_OB_FT
EOS calculated successfully. EOS Risk Factor: 0.5/1000 live births ([facility] national incidence); GA=36w1d; Temp=98.78; ROM=19.4; GBS='Unknown'; Antibiotics='GBS specific antibiotics > 2 hrs prior to birth'

## 2023-02-14 NOTE — DISCHARGE NOTE OB - PATIENT PORTAL LINK FT
You can access the FollowMyHealth Patient Portal offered by [facility] by registering at the following website: [email or web address]. By joining [facility]’s FollowMyHealth portal, you will also be able to view your health information using other applications (apps) compatible with our system.

## 2023-02-14 NOTE — OB RN DELIVERY SUMMARY - NS_INTRAPARTUMABXTYPE_OBGYN_ALL_OB
CT RN Call 2    Situation: Patient triggered for high risk readmission. Risk for Unplanned Readmission Score at Discharge is: 19%  Patient is enrolled in High Risk Medicare Bundles 90 day program for pneumonia.     Background: Initial Ct outreach. Patient was hospitalized from 7/30-8/5 with pneumonia. Per hospital summary:   Primary Diagnoses:   Active Problems:    Community acquired pneumonia    Supratherapeutic INR    COVID-19 virus infection    Essential hypertension, benign    Type 2 diabetes mellitus with stage 3 chronic kidney disease, without long-term current use of insulin (CMS/[facility])    Atrial fibrillation with RVR (CMS/HCC)    Long term (current) use of anticoagulants [Z79.01]    Asystole (CMS/[facility])    NSTEMI (non-ST elevated myocardial infarction) (CMS/HCC)    Blood in stool     Patient lives alone in a private residence.     Assessment:     - Primary Caregiver: self  - Assessment Completed with: patient  - Patient gave permission to discuss with: N/A  - Patient has their TCM or PCP post discharge visit AVS: N/A  - AVS was reviewed with the patient:  N/A  - Since discharge, patient is feeling better  - Activity: improved; she denies any falls since our last contact  - The patient is taking all medications as prescribed.  AVS medications and  instructions were reviewed with the patient.   - Validate that any new medications were picked up:  N/A  - Medication Review completed in Epic: Yes  - The patient did not have questions or concerns regarding the medications prescribed.  - The patient is having pain at this time.  - The patient's appetite is Fair; she admits that she doesn't drink enough fluids   - Patient is not having bowel movements.  - Safety Concerns: COVID complications   - Follow up care:  Upcoming appointments reviewed   - Assisted Patient in making the following appointments: none  - Patient has a TCM/PCP Visit on 8/17  - Patient verbalized understanding of upcoming appointment date/time and  will be able to attend the appointment.  - How will the patient get to the appointment? son will drive.  - Reviewed appropriate contacts for questions / concerns -directed patient to call CT RN as first stop unless life threating situation  - Recommendation: Will follow-up with patient on 8/20; patient's preferred time of day to call is not specified.    Lluvia reports that she is doing well this week. She feels like she is getting stronger each week. She has home care for nursing, and had her INR which was supratheraputic; anticoag notes were reviewed with patient and she confirms understanding. Her son sets up her med box and will adjust accordingly. She feels her breathing is stable, she has a productive cough, sputum is yellow. The coughing is usually worse upon waking in the morning. Patient denies additional questions/concerns today.    GBS specific antibiotics > 2 hrs prior to birth

## 2023-02-14 NOTE — DISCHARGE NOTE OB - NSDCQMERRANDS_GEN_ALL_CORE
1. Fiber well fiber gummies   1 or two daily or every other daily     1. Schedule Covid vaccine with [facility]      Through the [employer] or [employer] website or by calling our COVID-19 support hotline at 827-264-7426 to connect with a vaccine .      2. Vaccines.gov   this is a website that shows availability based on your zip-code and allows you to choose what vaccine you wish to receive        This site shows which pharmacies in your area have which vaccination and often provide a link to the scheduling site for that pharmacy .        
No

## 2023-02-14 NOTE — DISCHARGE NOTE OB - CARE PROVIDER_API CALL
Sajan Calixto)  MaternalFetal Medicine; Obstetrics and Gynecology  [address] [place] [id number]  Phone: [phone]  Fax: [phone]  Established Patient  Follow Up Time:

## 2023-02-14 NOTE — DISCHARGE NOTE OB - PLAN OF CARE
- continue routine pain mgt.  - continue perineal care   - pelvic rest for 6 wk  - Patient to follow up in 2wk  - Please notify MD if you experience persistent and overwhelming emotions inhibiting daily activities or infant care, persistent headache, excessive vaginal bleeding, foul smelling vaginal discharge, Urinary urgency/frequency/burning, or localized lower extremity swelling/warmth/redness. Check blood pressures three times a day.  Keep log to bring in with visit.  Call office for blood pressures greater than 140s/90s.  Return to office for blood pressures more than 160/110 or experiencing headaches unrelieved with medications, blurry vision, right upper quadrant pain, or epigastric pain.  Follow up with MD in 3 days for BP check.   Continue procardia 30XL daily.  Do not take for blood pressures less than 100/60

## 2023-02-14 NOTE — OB PROVIDER DELIVERY SUMMARY - NSSELHIDDEN_OBGYN_ALL_OB_FT
[NS_DeliveryAttending1_OBGYN_ALL_OB_FT:[id number]],[NS_DeliveryAssist1_OBGYN_ALL_OB_FT:[id number]=],[NS_DeliveryRN_OBGYN_ALL_OB_FT:[id number]=]

## 2023-02-14 NOTE — LACTATION INITIAL EVALUATION - LACTATION INTERVENTIONS
Primary RN made aware of consult and plan.  Feeding care plan for  infants, verbal /written reviewed and given to patient./initiate/review safe skin-to-skin/initiate/review hand expression/initiate/review pumping guidelines and safe milk handling/initiate/review techniques for position and latch/post discharge community resources provided/initiate/review supplementation plan due to medical indications/review techniques to increase milk supply/review techniques to manage sore nipples/engorgement/initiate/review breast massage/compression/reviewed components of an effective feeding and at least 8 effective feedings per day required/reviewed importance of monitoring infant diapers, the breastfeeding log, and minimum output each day/reviewed risks of unnecessary formula supplementation/reviewed benefits and recommendations for rooming in/reviewed feeding on demand/by cue at least 8 times a day

## 2023-02-14 NOTE — PROVIDER CONTACT NOTE (CHANGE IN STATUS NOTIFICATION) - ACTION/TREATMENT ORDERED:
to retake BP in 15mins as per MD taylor  BP @ 01:30 146/71  no new orders as per MD Taylor  HEL labs sent  to continue to monitor BP as per MD Taylor

## 2023-02-14 NOTE — PROGRESS NOTE ADULT - ASSESSMENT
A/P: 27y POD#1 s/p pLTCS for cat2 tracing c/b PEC. Patient is progressing appropriately post-partum   - Continue regular diet.  - Encourage ambulation  - Continue motrin, tylenol, oxycodone PRN for pain control  - Will have bandage be removed later in the AM given timing of delivery     #PEC  - Patient w/ unsustained severe range pressures intra-op. However, these were obtained from a leg BP cuff. There was a noted improvement in BPs once switching the cuff to the arm. Patient initially had some elevated BPs in PACU as well with the leg cuff  - Patient ordered for AM HELLP labs on 2/15 given timing of delivery   - Most recent BPs 120-130s/70-80s  - Ordered for home Nifedipine 30mg XL  - Otherwise Asx    #Leukocytosis w/ bandemia  - Will monitor w/ repeat CBC this AM  - Pt afebrile and not tachycardic  - Exam reassuring     Herber Vargas, PGY-1  Ob/Gyn    d/w Dr. Antonio

## 2023-02-14 NOTE — OB RN DELIVERY SUMMARY - NSSELHIDDEN_OBGYN_ALL_OB_FT
[NS_DeliveryAttending1_OBGYN_ALL_OB_FT:[id number]=],[NS_DeliveryAssist1_OBGYN_ALL_OB_FT:[id number]=],[NS_DeliveryRN_OBGYN_ALL_OB_FT:[id number]=]

## 2023-02-15 LAB
ALBUMIN SERPL ELPH-MCNC: 3.2 G/DL — LOW (ref 3.3–5)
ALP SERPL-CCNC: 81 U/L — SIGNIFICANT CHANGE UP (ref 40–120)
ALT FLD-CCNC: 13 U/L — SIGNIFICANT CHANGE UP (ref 4–33)
ANION GAP SERPL CALC-SCNC: 9 MMOL/L — SIGNIFICANT CHANGE UP (ref 7–14)
APTT BLD: 25.2 SEC — LOW (ref 27–36.3)
AST SERPL-CCNC: 20 U/L — SIGNIFICANT CHANGE UP (ref 4–32)
BASOPHILS # BLD AUTO: 0.02 K/UL — SIGNIFICANT CHANGE UP (ref 0–0.2)
BASOPHILS NFR BLD AUTO: 0.1 % — SIGNIFICANT CHANGE UP (ref 0–2)
BILIRUB SERPL-MCNC: <0.2 MG/DL — SIGNIFICANT CHANGE UP (ref 0.2–1.2)
BUN SERPL-MCNC: 11 MG/DL — SIGNIFICANT CHANGE UP (ref 7–23)
CALCIUM SERPL-MCNC: 9.4 MG/DL — SIGNIFICANT CHANGE UP (ref 8.4–10.5)
CHLORIDE SERPL-SCNC: 99 MMOL/L — SIGNIFICANT CHANGE UP (ref 98–107)
CO2 SERPL-SCNC: 26 MMOL/L — SIGNIFICANT CHANGE UP (ref 22–31)
CREAT SERPL-MCNC: 0.53 MG/DL — SIGNIFICANT CHANGE UP (ref 0.5–1.3)
EGFR: 131 ML/MIN/1.73M2 — SIGNIFICANT CHANGE UP
EOSINOPHIL # BLD AUTO: 0.04 K/UL — SIGNIFICANT CHANGE UP (ref 0–0.5)
EOSINOPHIL NFR BLD AUTO: 0.2 % — SIGNIFICANT CHANGE UP (ref 0–6)
FIBRINOGEN PPP-MCNC: 633 MG/DL — HIGH (ref 200–465)
GLUCOSE SERPL-MCNC: 87 MG/DL — SIGNIFICANT CHANGE UP (ref 70–99)
HCT VFR BLD CALC: 27.6 % — LOW (ref 34.5–45)
HGB BLD-MCNC: 9.3 G/DL — LOW (ref 11.5–15.5)
IANC: 13.61 K/UL — HIGH (ref 1.8–7.4)
IMM GRANULOCYTES NFR BLD AUTO: 0.8 % — SIGNIFICANT CHANGE UP (ref 0–0.9)
INR BLD: 1 RATIO — SIGNIFICANT CHANGE UP (ref 0.88–1.16)
LDH SERPL L TO P-CCNC: 202 U/L — SIGNIFICANT CHANGE UP (ref 135–225)
LYMPHOCYTES # BLD AUTO: 18.4 % — SIGNIFICANT CHANGE UP (ref 13–44)
LYMPHOCYTES # BLD AUTO: 3.33 K/UL — HIGH (ref 1–3.3)
MCHC RBC-ENTMCNC: 29.8 PG — SIGNIFICANT CHANGE UP (ref 27–34)
MCHC RBC-ENTMCNC: 33.7 GM/DL — SIGNIFICANT CHANGE UP (ref 32–36)
MCV RBC AUTO: 88.5 FL — SIGNIFICANT CHANGE UP (ref 80–100)
MONOCYTES # BLD AUTO: 0.95 K/UL — HIGH (ref 0–0.9)
MONOCYTES NFR BLD AUTO: 5.2 % — SIGNIFICANT CHANGE UP (ref 2–14)
NEUTROPHILS # BLD AUTO: 13.61 K/UL — HIGH (ref 1.8–7.4)
NEUTROPHILS NFR BLD AUTO: 75.3 % — SIGNIFICANT CHANGE UP (ref 43–77)
NRBC # BLD: 0 /100 WBCS — SIGNIFICANT CHANGE UP (ref 0–0)
NRBC # FLD: 0 K/UL — SIGNIFICANT CHANGE UP (ref 0–0)
PLATELET # BLD AUTO: 190 K/UL — SIGNIFICANT CHANGE UP (ref 150–400)
POTASSIUM SERPL-MCNC: 4.2 MMOL/L — SIGNIFICANT CHANGE UP (ref 3.5–5.3)
POTASSIUM SERPL-SCNC: 4.2 MMOL/L — SIGNIFICANT CHANGE UP (ref 3.5–5.3)
PROT SERPL-MCNC: 6.3 G/DL — SIGNIFICANT CHANGE UP (ref 6–8.3)
PROTHROM AB SERPL-ACNC: 11.6 SEC — SIGNIFICANT CHANGE UP (ref 10.5–13.4)
RBC # BLD: 3.12 M/UL — LOW (ref 3.8–5.2)
RBC # FLD: 13.8 % — SIGNIFICANT CHANGE UP (ref 10.3–14.5)
SODIUM SERPL-SCNC: 134 MMOL/L — LOW (ref 135–145)
URATE SERPL-MCNC: 5.8 MG/DL — SIGNIFICANT CHANGE UP (ref 2.5–7)
WBC # BLD: 18.1 K/UL — HIGH (ref 3.8–10.5)
WBC # FLD AUTO: 18.1 K/UL — HIGH (ref 3.8–10.5)

## 2023-02-15 RX ORDER — ACETAMINOPHEN 500 MG
3 TABLET ORAL
Qty: 0 | Refills: 0 | DISCHARGE
Start: 2023-02-15

## 2023-02-15 RX ORDER — SENNA PLUS 8.6 MG/1
1 TABLET ORAL
Refills: 0 | Status: DISCONTINUED | OUTPATIENT
Start: 2023-02-15 | End: 2023-02-16

## 2023-02-15 RX ORDER — ASCORBIC ACID 60 MG
500 TABLET,CHEWABLE ORAL DAILY
Refills: 0 | Status: DISCONTINUED | OUTPATIENT
Start: 2023-02-15 | End: 2023-02-16

## 2023-02-15 RX ORDER — IBUPROFEN 200 MG
1 TABLET ORAL
Qty: 0 | Refills: 0 | DISCHARGE
Start: 2023-02-15

## 2023-02-15 RX ORDER — NIFEDIPINE 30 MG
1 TABLET, EXTENDED RELEASE 24 HR ORAL
Qty: 0 | Refills: 0 | DISCHARGE

## 2023-02-15 RX ORDER — SENNA PLUS 8.6 MG/1
1 TABLET ORAL
Qty: 0 | Refills: 0 | DISCHARGE
Start: 2023-02-15

## 2023-02-15 RX ORDER — NIFEDIPINE 30 MG
1 TABLET, EXTENDED RELEASE 24 HR ORAL
Qty: 30 | Refills: 0
Start: 2023-02-15 | End: 2023-03-16

## 2023-02-15 RX ORDER — IBUPROFEN 200 MG
600 TABLET ORAL EVERY 6 HOURS
Refills: 0 | Status: DISCONTINUED | OUTPATIENT
Start: 2023-02-15 | End: 2023-02-16

## 2023-02-15 RX ORDER — ASPIRIN/CALCIUM CARB/MAGNESIUM 324 MG
1 TABLET ORAL
Qty: 0 | Refills: 0 | DISCHARGE

## 2023-02-15 RX ORDER — FERROUS SULFATE 325(65) MG
325 TABLET ORAL
Refills: 0 | Status: DISCONTINUED | OUTPATIENT
Start: 2023-02-15 | End: 2023-02-16

## 2023-02-15 RX ADMIN — Medication 975 MILLIGRAM(S): at 09:15

## 2023-02-15 RX ADMIN — Medication 600 MILLIGRAM(S): at 17:50

## 2023-02-15 RX ADMIN — HEPARIN SODIUM 5000 UNIT(S): 5000 INJECTION INTRAVENOUS; SUBCUTANEOUS at 17:49

## 2023-02-15 RX ADMIN — Medication 600 MILLIGRAM(S): at 12:15

## 2023-02-15 RX ADMIN — Medication 600 MILLIGRAM(S): at 18:20

## 2023-02-15 RX ADMIN — Medication 325 MILLIGRAM(S): at 17:50

## 2023-02-15 RX ADMIN — HEPARIN SODIUM 5000 UNIT(S): 5000 INJECTION INTRAVENOUS; SUBCUTANEOUS at 05:41

## 2023-02-15 RX ADMIN — Medication 30 MILLIGRAM(S): at 00:35

## 2023-02-15 RX ADMIN — Medication 600 MILLIGRAM(S): at 06:12

## 2023-02-15 RX ADMIN — Medication 975 MILLIGRAM(S): at 15:20

## 2023-02-15 RX ADMIN — Medication 975 MILLIGRAM(S): at 09:45

## 2023-02-15 RX ADMIN — Medication 975 MILLIGRAM(S): at 21:15

## 2023-02-15 RX ADMIN — Medication 975 MILLIGRAM(S): at 20:39

## 2023-02-15 RX ADMIN — Medication 600 MILLIGRAM(S): at 11:45

## 2023-02-15 RX ADMIN — Medication 975 MILLIGRAM(S): at 15:50

## 2023-02-15 RX ADMIN — Medication 30 MILLIGRAM(S): at 00:09

## 2023-02-15 RX ADMIN — Medication 600 MILLIGRAM(S): at 05:41

## 2023-02-15 RX ADMIN — Medication 30 MILLIGRAM(S): at 05:41

## 2023-02-15 RX ADMIN — Medication 500 MILLIGRAM(S): at 11:43

## 2023-02-16 VITALS
SYSTOLIC BLOOD PRESSURE: 116 MMHG | TEMPERATURE: 98 F | OXYGEN SATURATION: 100 % | RESPIRATION RATE: 18 BRPM | HEART RATE: 88 BPM | DIASTOLIC BLOOD PRESSURE: 76 MMHG

## 2023-02-16 RX ADMIN — Medication 975 MILLIGRAM(S): at 10:20

## 2023-02-16 RX ADMIN — Medication 975 MILLIGRAM(S): at 02:28

## 2023-02-16 RX ADMIN — Medication 600 MILLIGRAM(S): at 06:26

## 2023-02-16 RX ADMIN — Medication 500 MILLIGRAM(S): at 12:00

## 2023-02-16 RX ADMIN — Medication 600 MILLIGRAM(S): at 07:00

## 2023-02-16 RX ADMIN — Medication 600 MILLIGRAM(S): at 12:00

## 2023-02-16 RX ADMIN — Medication 975 MILLIGRAM(S): at 09:15

## 2023-02-16 RX ADMIN — Medication 975 MILLIGRAM(S): at 03:00

## 2023-02-16 RX ADMIN — Medication 30 MILLIGRAM(S): at 06:47

## 2023-02-16 RX ADMIN — Medication 600 MILLIGRAM(S): at 12:30

## 2023-02-16 RX ADMIN — HEPARIN SODIUM 5000 UNIT(S): 5000 INJECTION INTRAVENOUS; SUBCUTANEOUS at 06:47

## 2023-02-16 RX ADMIN — Medication 325 MILLIGRAM(S): at 06:47

## 2023-02-16 NOTE — PROGRESS NOTE ADULT - SUBJECTIVE AND OBJECTIVE BOX
S: 28yo POD#2 s/p pLTCS for category 2 tracing c/b PEC without severe features.  Currently on procardia 30XL daily.  Pain is well controlled. She is tolerating a regular diet and passing flatus. She is voiding spontaneously, and ambulating without difficulty. Denies CP/SOB. Denies lightheadedness/dizziness. Denies N/V.    O:  Vitals:  Vital Signs Last 24 Hrs  T(C): 36.9 (15 Feb 2023 05:38), Max: 37.1 (14 Feb 2023 10:00)  T(F): 98.5 (15 Feb 2023 05:38), Max: 98.7 (14 Feb 2023 10:00)  HR: 83 (15 Feb 2023 05:38) (70 - 86)  BP: 126/84 (15 Feb 2023 05:38) (121/77 - 138/85)  BP(mean): --  RR: 18 (15 Feb 2023 05:38) (18 - 18)  SpO2: 97% (15 Feb 2023 05:38) (95% - 98%)    Parameters below as of 14 Feb 2023 14:02  Patient On (Oxygen Delivery Method): room air        MEDICATIONS  (STANDING):  acetaminophen     Tablet .. 975 milliGRAM(s) Oral <User Schedule>  diphtheria/tetanus/pertussis (acellular) Vaccine (Adacel) 0.5 milliLiter(s) IntraMuscular once  heparin   Injectable 5000 Unit(s) SubCutaneous every 12 hours  ibuprofen  Tablet. 600 milliGRAM(s) Oral every 6 hours  lactated ringers. 1000 milliLiter(s) (75 mL/Hr) IV Continuous <Continuous>  NIFEdipine XL 30 milliGRAM(s) Oral daily  oxytocin Infusion 333.333 milliUNIT(s)/Min (1000 mL/Hr) IV Continuous <Continuous>      MEDICATIONS  (PRN):  dexAMETHasone  Injectable 4 milliGRAM(s) IV Push every 6 hours PRN Nausea  diphenhydrAMINE 25 milliGRAM(s) Oral every 6 hours PRN Pruritus  lanolin Ointment 1 Application(s) Topical every 6 hours PRN Sore Nipples  magnesium hydroxide Suspension 30 milliLiter(s) Oral two times a day PRN Constipation  nalbuphine Injectable 2.5 milliGRAM(s) IV Push every 6 hours PRN Pruritus  naloxone Injectable 0.1 milliGRAM(s) IV Push every 3 minutes PRN For ANY of the following changes in patient status:  A. Breaths Per Minute LESS THAN 10, B. Oxygen saturation LESS THAN 90%, C. Sedation score of 6 for Stop After: 4 Times  ondansetron Injectable 4 milliGRAM(s) IV Push every 6 hours PRN Nausea  oxyCODONE    IR 5 milliGRAM(s) Oral every 3 hours PRN Moderate to Severe Pain (4-10)  oxyCODONE    IR 5 milliGRAM(s) Oral once PRN Moderate to Severe Pain (4-10)  simethicone 80 milliGRAM(s) Chew every 4 hours PRN Gas      Labs:  Blood type: A Positive  Rubella IgG: RPR: Negative                          9.3<L>   18.10<H> >-----------< 190    ( 02-15 @ 05:09 )             27.6<L>                        10.3<L>   24.56<H> >-----------< 175    ( 02-14 @ 08:15 )             30.9<L>                        10.1<L>   22.39<H> >-----------< 185    ( 02-14 @ 01:24 )             30.3<L>                        12.2   15.36<H> >-----------< 219    ( 02-13 @ 17:20 )             36.2                        12.1   9.95 >-----------< 226    ( 02-13 @ 11:25 )             36.2                        12.6   10.79<H> >-----------< 227    ( 02-13 @ 05:30 )             38.3    02-15-23 @ 05:09      134<L>  |  99  |  11  ----------------------------<  87  4.2   |  26  |  0.53    02-14-23 @ 01:24      135  |  102  |  5<L>  ----------------------------<  116<H>  4.0   |  21<L>  |  0.43<L>    02-13-23 @ 17:20      134<L>  |  100  |  6<L>  ----------------------------<  85  3.6   |  21<L>  |  0.43<L>    02-13-23 @ 11:25      138  |  103  |  7   ----------------------------<  77  3.9   |  23  |  0.43<L>    02-13-23 @ 05:30      135  |  99  |  11  ----------------------------<  99  3.9   |  22  |  0.53        Ca    9.4      15 Feb 2023 05:09  Ca    8.5      14 Feb 2023 01:24  Ca    9.2      13 Feb 2023 17:20  Ca    9.4      13 Feb 2023 11:25  Ca    9.8      13 Feb 2023 05:30    TPro  6.3  /  Alb  3.2<L>  /  TBili  <0.2  /  DBili  x   /  AST  20  /  ALT  13  /  AlkPhos  81  02-15-23 @ 05:09  TPro  5.5<L>  /  Alb  2.9<L>  /  TBili  <0.2  /  DBili  x   /  AST  15  /  ALT  11  /  AlkPhos  84  02-14-23 @ 01:24  TPro  6.6  /  Alb  3.5  /  TBili  0.2  /  DBili  x   /  AST  14  /  ALT  12  /  AlkPhos  104  02-13-23 @ 17:20  TPro  6.7  /  Alb  3.6  /  TBili  <0.2  /  DBili  x   /  AST  14  /  ALT  12  /  AlkPhos  103  02-13-23 @ 11:25  TPro  7.4  /  Alb  3.8  /  TBili  <0.2  /  DBili  x   /  AST  16  /  ALT  12  /  AlkPhos  112  02-13-23 @ 05:30          PE:  General: NAD  Abdomen: Soft, appropriately tender, incision c/d/i with steristrips  Lochia: WNL  Extremities: No calf tenderness    A/P: 28yo POD#2 s/p pLTCS for category 2 tracing.  Patient is stable and doing well post-operatively.      #PEC without severe features  - BPs normotensive 120-130s/70-80s  -Continue procardia 30 XL daily, [facility]  -Has BP Cuff at home, offered a prescription for BP cuff but patient declined   -instructions given on BP monitoring; TID; call MD office if greater than 140/90; report to emergency room is greater than 160/110  -reviewed signs and symptoms related to preeclampsia with patient such as HA, Change in vision, N/V. RUQ pain   -keep log BP's to present to MD during appointment in 3 days for nursing BP check  -All questions answered, patient verbalized understanding     #Leukocytosis  - Afebrile  - WBC downtrending 22.39-->24.56-->18    #Postpartum  - Continue regular diet.  - Encouraged use of abdominal binder  - Increase ambulation.  - Continue motrin, tylenol, oxycodone PRN for pain control.    - Continue iron & vitamin C  - Discharge planning    Carissa FRIEND
ANESTHESIA POSTOP CHECK    27y Female POSTOP DAY 1 S/P       NO APPARENT ANESTHESIA COMPLICATIONS    
Postpartum Note,  Section  She is a  27y woman who is now post-operative day: 1    Subjective:  The patient c/o feeling nausea/dizziness.  Was just given zofran.     Physical exam:    Vital Signs Last 24 Hrs  T(C): 37.1 (2023 10:00), Max: 37.1 (2023 20:24)  T(F): 98.7 (2023 10:00), Max: 98.8 (2023 03:50)  HR: 92 (2023 05:15) (68 - 140)  BP: 140/79 (2023 05:15) (112/57 - 167/74)  BP(mean): 90 (2023 04:00) (82 - 96)  RR: 18 (2023 10:00) (16 - 23)  SpO2: 98% (2023 10:00) (94% - 100%)    Parameters below as of 2023 10:00  Patient On (Oxygen Delivery Method): room air        Gen: NAD  Abdomen: Soft, nontender, no distension , firm uterine fundus at umbilicus.  Incision: Clean, dry, and intact   Ext: No calf tenderness    LABS:                        10.3   24.56 )-----------( 175      ( 2023 08:15 )             30.9                         10.1   22.39 )-----------( 185      ( 2023 01:24 )             30.3                         12.2   15.36 )-----------( 219      ( 2023 17:20 )             36.2                         12.1   9.95  )-----------( 226      ( 2023 11:25 )             36.2                         12.6   10.79 )-----------( 227      ( 2023 05:30 )             38.3       23 @ 01:24      135  |  102  |  5<L>  ----------------------------<  116<H>  4.0   |  21<L>  |  0.43<L>    23 @ 17:20      134<L>  |  100  |  6<L>  ----------------------------<  85  3.6   |  21<L>  |  0.43<L>    23 @ 11:25      138  |  103  |  7   ----------------------------<  77  3.9   |  23  |  0.43<L>    23 @ 05:30      135  |  99  |  11  ----------------------------<  99  3.9   |  22  |  0.53        Ca    8.5      2023 01:24  Ca    9.2      2023 17:20  Ca    9.4      2023 11:25  Ca    9.8      2023 05:30    TPro  5.5<L>  /  Alb  2.9<L>  /  TBili  <0.2  /  DBili  x   /  AST  15  /  ALT  11  /  AlkPhos  84  23 @ 01:24  TPro  6.6  /  Alb  3.5  /  TBili  0.2  /  DBili  x   /  AST  14  /  ALT  12  /  AlkPhos  104  23 @ 17:20  TPro  6.7  /  Alb  3.6  /  TBili  <0.2  /  DBili  x   /  AST  14  /  ALT  12  /  AlkPhos  103  23 @ 11:25  TPro  7.4  /  Alb  3.8  /  TBili  <0.2  /  DBili  x   /  AST  16  /  ALT  12  /  AlkPhos  112  23 @ 05:30        Allergies    No Known Allergies    Intolerances      MEDICATIONS  (STANDING):  acetaminophen     Tablet .. 975 milliGRAM(s) Oral <User Schedule>  diphtheria/tetanus/pertussis (acellular) Vaccine (Adacel) 0.5 milliLiter(s) IntraMuscular once  heparin   Injectable 5000 Unit(s) SubCutaneous every 12 hours  ibuprofen  Tablet. 600 milliGRAM(s) Oral every 6 hours  ketorolac   Injectable 30 milliGRAM(s) IV Push every 6 hours  lactated ringers. 1000 milliLiter(s) (75 mL/Hr) IV Continuous <Continuous>  NIFEdipine XL 30 milliGRAM(s) Oral daily  oxytocin Infusion 333.333 milliUNIT(s)/Min (1000 mL/Hr) IV Continuous <Continuous>    MEDICATIONS  (PRN):  dexAMETHasone  Injectable 4 milliGRAM(s) IV Push every 6 hours PRN Nausea  diphenhydrAMINE 25 milliGRAM(s) Oral every 6 hours PRN Pruritus  lanolin Ointment 1 Application(s) Topical every 6 hours PRN Sore Nipples  magnesium hydroxide Suspension 30 milliLiter(s) Oral two times a day PRN Constipation  nalbuphine Injectable 2.5 milliGRAM(s) IV Push every 6 hours PRN Pruritus  naloxone Injectable 0.1 milliGRAM(s) IV Push every 3 minutes PRN For ANY of the following changes in patient status:  A. Breaths Per Minute LESS THAN 10, B. Oxygen saturation LESS THAN 90%, C. Sedation score of 6 for Stop After: 4 Times  ondansetron Injectable 4 milliGRAM(s) IV Push every 6 hours PRN Nausea  oxyCODONE    IR 5 milliGRAM(s) Oral every 3 hours PRN Mild Pain (1 - 3)  oxyCODONE    IR 10 milliGRAM(s) Oral every 3 hours PRN Moderate Pain (4 - 6)  oxyCODONE    IR 5 milliGRAM(s) Oral every 3 hours PRN Moderate to Severe Pain (4-10)  oxyCODONE    IR 5 milliGRAM(s) Oral once PRN Moderate to Severe Pain (4-10)  simethicone 80 milliGRAM(s) Chew every 4 hours PRN Gas        Assessment and Plan  POD #1 s/p  section  Dizzy/nauseas, leukocytosis   Encourage ambulation  Continue current pain management  Continue SCD when in bed  continue to monitor   f/u cbc in am  KHOA Bolanos MD        
S: 28yo POD#3 s/p pLTCS for category 2 tracing c/b PEC without severe features.  Currently on procardia 30XL daily.  Pain is well controlled. She is tolerating a regular diet and passing flatus. She is voiding spontaneously, and ambulating without difficulty. Denies CP/SOB. Denies lightheadedness/dizziness. Denies N/V.    O:  Vitals:  Vital Signs Last 24 Hrs  T(C): 37 (16 Feb 2023 06:00), Max: 37 (16 Feb 2023 06:00)  T(F): 98.6 (16 Feb 2023 06:00), Max: 98.6 (16 Feb 2023 06:00)  HR: 80 (16 Feb 2023 06:00) (76 - 92)  BP: 120/79 (16 Feb 2023 06:00) (108/71 - 125/72)  BP(mean): --  RR: 18 (16 Feb 2023 06:00) (18 - 18)  SpO2: 100% (16 Feb 2023 06:00) (98% - 100%)    Parameters below as of 16 Feb 2023 06:00  Patient On (Oxygen Delivery Method): room air        MEDICATIONS  (STANDING):  acetaminophen     Tablet .. 975 milliGRAM(s) Oral <User Schedule>  ascorbic acid 500 milliGRAM(s) Oral daily  diphtheria/tetanus/pertussis (acellular) Vaccine (Adacel) 0.5 milliLiter(s) IntraMuscular once  ferrous    sulfate 325 milliGRAM(s) Oral two times a day  heparin   Injectable 5000 Unit(s) SubCutaneous every 12 hours  ibuprofen  Tablet. 600 milliGRAM(s) Oral every 6 hours  NIFEdipine XL 30 milliGRAM(s) Oral daily    MEDICATIONS  (PRN):  dexAMETHasone  Injectable 4 milliGRAM(s) IV Push every 6 hours PRN Nausea  diphenhydrAMINE 25 milliGRAM(s) Oral every 6 hours PRN Pruritus  lanolin Ointment 1 Application(s) Topical every 6 hours PRN Sore Nipples  magnesium hydroxide Suspension 30 milliLiter(s) Oral two times a day PRN Constipation  nalbuphine Injectable 2.5 milliGRAM(s) IV Push every 6 hours PRN Pruritus  naloxone Injectable 0.1 milliGRAM(s) IV Push every 3 minutes PRN For ANY of the following changes in patient status:  A. Breaths Per Minute LESS THAN 10, B. Oxygen saturation LESS THAN 90%, C. Sedation score of 6 for Stop After: 4 Times  ondansetron Injectable 4 milliGRAM(s) IV Push every 6 hours PRN Nausea  oxyCODONE    IR 5 milliGRAM(s) Oral every 3 hours PRN Moderate to Severe Pain (4-10)  oxyCODONE    IR 5 milliGRAM(s) Oral once PRN Moderate to Severe Pain (4-10)  senna 1 Tablet(s) Oral two times a day PRN Constipation  simethicone 80 milliGRAM(s) Chew every 4 hours PRN Gas      LABS:  Blood type: A Positive  Rubella IgG: RPR: Negative                          9.3<L>   18.10<H> >-----------< 190    ( 02-15 @ 05:09 )             27.6<L>                        10.3<L>   24.56<H> >-----------< 175    ( 02-14 @ 08:15 )             30.9<L>                        10.1<L>   22.39<H> >-----------< 185    ( 02-14 @ 01:24 )             30.3<L>                        12.2   15.36<H> >-----------< 219    ( 02-13 @ 17:20 )             36.2                        12.1   9.95 >-----------< 226    ( 02-13 @ 11:25 )             36.2    02-15-23 @ 05:09      134<L>  |  99  |  11  ----------------------------<  87  4.2   |  26  |  0.53    02-14-23 @ 01:24      135  |  102  |  5<L>  ----------------------------<  116<H>  4.0   |  21<L>  |  0.43<L>    02-13-23 @ 17:20      134<L>  |  100  |  6<L>  ----------------------------<  85  3.6   |  21<L>  |  0.43<L>    02-13-23 @ 11:25      138  |  103  |  7   ----------------------------<  77  3.9   |  23  |  0.43<L>        Ca    9.4      15 Feb 2023 05:09  Ca    8.5      14 Feb 2023 01:24  Ca    9.2      13 Feb 2023 17:20  Ca    9.4      13 Feb 2023 11:25    TPro  6.3  /  Alb  3.2<L>  /  TBili  <0.2  /  DBili  x   /  AST  20  /  ALT  13  /  AlkPhos  81  02-15-23 @ 05:09  TPro  5.5<L>  /  Alb  2.9<L>  /  TBili  <0.2  /  DBili  x   /  AST  15  /  ALT  11  /  AlkPhos  84  02-14-23 @ 01:24  TPro  6.6  /  Alb  3.5  /  TBili  0.2  /  DBili  x   /  AST  14  /  ALT  12  /  AlkPhos  104  02-13-23 @ 17:20  TPro  6.7  /  Alb  3.6  /  TBili  <0.2  /  DBili  x   /  AST  14  /  ALT  12  /  AlkPhos  103  02-13-23 @ 11:25        A/P: 28yo POD#3 s/p pLTCS for category 2 tracing.  Patient is stable and doing well post-operatively.      #PEC without severe features  - BPs normotensive 120-130s/70-80s  -Continue procardia 30 XL daily, [facility]  -Has BP Cuff at home, offered a prescription for BP cuff but patient declined   -instructions given on BP monitoring; TID; call MD office if greater than 140/90; report to emergency room is greater than 160/110  -reviewed signs and symptoms related to preeclampsia with patient such as HA, Change in vision, N/V. RUQ pain   -keep log BP's to present to MD during appointment in 3 days for nursing BP check  -All questions answered, patient verbalized understanding     #Leukocytosis  - Afebrile  - WBC downtrending 22.39-->24.56-->18    #Postpartum  - Continue regular diet.  - Encouraged use of abdominal binder  - Increase ambulation.  - Continue motrin, tylenol, oxycodone PRN for pain control.    - Continue iron & vitamin C  - Discharge planned for today    Carissa FRIEND        
OB Progress Note:  Delivery, POD#1  - Pt was seen in the PACU    S: 27y POD#1 s/p pLTCS for cat2 tracing c/b PEC. Tolerated liquids w/o vomiting. Intermittent nausea. Denies n/v, chest pain, shortness of breath, or lightheadedness/dizziness. Not yet ambulating and yi is in place. Denies any headaches or scotomas     O:   Vital Signs Last 24 Hrs  T(C): 37.1 (2023 03:50), Max: 37.1 (2023 06:45)  T(F): 98.8 (2023 03:50), Max: 98.8 (2023 03:50)  HR: 88 (2023 04:00) (68 - 140)  BP: 131/78 (2023 04:00) (112/57 - 167/74)  BP(mean): 90 (2023 04:00) (82 - 96)  RR: 20 (2023 04:00) (16 - 23)  SpO2: 96% (2023 04:00) (93% - 100%)    Parameters below as of 2023 07:19  Patient On (Oxygen Delivery Method): room air        Labs:  Blood type: A Positive  Rubella IgG: RPR: Negative                          10.1<L>   22.39<H> >-----------< 185    (  @ 01:24 )             30.3<L>                        12.2   15.36<H> >-----------< 219    (  @ 17:20 )             36.2                        12.1   9.95 >-----------< 226    (  @ 11:25 )             36.2                        12.6   10.79<H> >-----------< 227    (  @ 05:30 )             38.3    23 @ 01:24      135  |  102  |  5<L>  ----------------------------<  116<H>  4.0   |  21<L>  |  0.43<L>    23 @ 17:20      134<L>  |  100  |  6<L>  ----------------------------<  85  3.6   |  21<L>  |  0.43<L>    23 @ 11:25      138  |  103  |  7   ----------------------------<  77  3.9   |  23  |  0.43<L>    23 @ 05:30      135  |  99  |  11  ----------------------------<  99  3.9   |  22  |  0.53        Ca    8.5      2023 01:24  Ca    9.2      2023 17:20  Ca    9.4      2023 11:25  Ca    9.8      2023 05:30    TPro  5.5<L>  /  Alb  2.9<L>  /  TBili  <0.2  /  DBili  x   /  AST  15  /  ALT  11  /  AlkPhos  84  23 @ 01:24  TPro  6.6  /  Alb  3.5  /  TBili  0.2  /  DBili  x   /  AST  14  /  ALT  12  /  AlkPhos  104  23 @ 17:20  TPro  6.7  /  Alb  3.6  /  TBili  <0.2  /  DBili  x   /  AST  14  /  ALT  12  /  AlkPhos  103  23 @ 11:25  TPro  7.4  /  Alb  3.8  /  TBili  <0.2  /  DBili  x   /  AST  16  /  ALT  12  /  AlkPhos  112  23 @ 05:30          PE:  General: No acute distress  Pulm: Unlabored breathing. No respiratory distress  Abdomen: Soft. Non-tender. Incision c/d/i   : Catalina is in place   Extremities: No calf tenderness bilaterally

## 2023-02-17 ENCOUNTER — NON-APPOINTMENT (OUTPATIENT)
Age: 28
End: 2023-02-17

## 2023-02-21 ENCOUNTER — NON-APPOINTMENT (OUTPATIENT)
Age: 28
End: 2023-02-21

## 2023-02-22 ENCOUNTER — NON-APPOINTMENT (OUTPATIENT)
Age: 28
End: 2023-02-22

## 2023-02-28 PROBLEM — Z78.9 OTHER SPECIFIED HEALTH STATUS: Chronic | Status: ACTIVE | Noted: 2023-02-13

## 2023-03-06 ENCOUNTER — APPOINTMENT (OUTPATIENT)
Dept: OBGYN | Facility: CLINIC | Age: 28
End: 2023-03-06
Payer: COMMERCIAL

## 2023-03-06 VITALS
DIASTOLIC BLOOD PRESSURE: 84 MMHG | HEIGHT: 63 IN | HEART RATE: 130 BPM | WEIGHT: 147 LBS | BODY MASS INDEX: 26.05 KG/M2 | SYSTOLIC BLOOD PRESSURE: 138 MMHG

## 2023-03-06 PROCEDURE — 99212 OFFICE O/P EST SF 10 MIN: CPT

## 2023-03-06 NOTE — HISTORY OF PRESENT ILLNESS
[Delivery Date: ___] : on [unfilled] [Primary C/S] : delivered by  section [Breastfeeding] : currently nursing [Clean/Dry/Intact] : clean, dry and intact [Erythema] : not erythematous [Swelling] : not swollen [Dehiscence] : not dehisced [Healed] : not healed [None] : no vaginal bleeding [Doing Well] : is doing well [No Sign of Infection] : is showing no signs of infection [Excellent Pain Control] : has excellent pain control [FreeTextEntry9] : chronic hypertension [FreeTextEntry8] : incision check [de-identified] : preeclampsia [FreeTextEntry1] : 28 y/o F presenting for incision check\par -Incision healing well\par -patient to continue taking nifedepine 30mg daily, and monitoring bp twice daily, bp's at home wnl as per patient\par -pt denies any headache, burry vision, or ruq pain\par -f/u for 6 week PP visit\par

## 2023-03-11 LAB — SURGICAL PATHOLOGY STUDY: SIGNIFICANT CHANGE UP

## 2023-03-27 ENCOUNTER — APPOINTMENT (OUTPATIENT)
Dept: OBGYN | Facility: CLINIC | Age: 28
End: 2023-03-27
Payer: COMMERCIAL

## 2023-03-27 VITALS
WEIGHT: 150 LBS | BODY MASS INDEX: 26.58 KG/M2 | HEIGHT: 63 IN | SYSTOLIC BLOOD PRESSURE: 138 MMHG | DIASTOLIC BLOOD PRESSURE: 90 MMHG

## 2023-03-27 PROCEDURE — 99212 OFFICE O/P EST SF 10 MIN: CPT

## 2023-04-03 NOTE — HISTORY OF PRESENT ILLNESS
[Delivery Date: ___] : on [unfilled] [Primary C/S] : delivered by  section [Female] : Delivery History: baby girl [Wt. ___] : weighing [unfilled] [Breastfeeding] : currently nursing [BF with Difficulty] : nursing without difficulty [Clean/Dry/Intact] : clean, dry and intact [Erythema] : not erythematous [Swelling] : not swollen [Dehiscence] : not dehisced [Healed] : healed [Examination Of The Breasts] : breasts are normal [Doing Well] : is doing well [No Sign of Infection] : is showing no signs of infection [Excellent Pain Control] : has excellent pain control [None] : None [FreeTextEntry8] : post partum visit [FreeTextEntry9] : chronic hypertension [de-identified] : 36 weeks [de-identified] : breast and bottle [FreeTextEntry1] : 28 y/o F s/p C/S presenting for postpartum visit: \par -incision healing well\par -Patient cleared to resume normal activity (sexual intercourse and exercise)\par -Patient counseled on contraception options, desires condoms\par -f/u for 3 months for annual\par

## 2023-04-04 ENCOUNTER — NON-APPOINTMENT (OUTPATIENT)
Age: 28
End: 2023-04-04

## 2023-06-26 ENCOUNTER — APPOINTMENT (OUTPATIENT)
Dept: OBGYN | Facility: CLINIC | Age: 28
End: 2023-06-26

## 2024-04-02 NOTE — OB RN TRIAGE NOTE - HEART RATE (BEATS/MIN)
LOC. Pt is on ASA 81 mg. Pt had 2 people assisting her and she still fell. No head pain/neck pain. Pt does c/o back pain as well.   
112

## 2024-06-23 NOTE — OB PROVIDER H&P - LABOR: CERVICAL POSITION
posterior [Dry Eyes] : dryness of the eyes [Joint Pain] : joint pain [Negative] : Neurological [Feeling Tired] : feeling tired [As Noted in HPI] : as noted in HPI